# Patient Record
Sex: FEMALE | Race: WHITE | NOT HISPANIC OR LATINO | Employment: STUDENT | URBAN - METROPOLITAN AREA
[De-identification: names, ages, dates, MRNs, and addresses within clinical notes are randomized per-mention and may not be internally consistent; named-entity substitution may affect disease eponyms.]

---
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
## 2025-01-01 NOTE — ED PROVIDER NOTES
ED PHYSICIAN NOTE     CHIEF COMPLAINT      Chief Complaint   Patient presents with    Sent by MD Jenn Alvarado         EXTERNAL RECORDS REVIEWED  Outpatient Notes Evaluated by PCP earlier today and reported suicidal ideations.      HPI/ROS  LIMITATION TO HISTORY   Select: : None  OUTSIDE HISTORIAN(S):  Parent Mother and Father     Eva Washington is a 19 y.o. female with a history of anxiety, bipolar disorder and insomnia who presents to the ED for a psychiatric evaluation following an office visit with her PCP earlier today. Per his note, the patient had expressed suicidal ideations with the plan to cut her wrists with scissors or a knife. However, the patient states she briefly had these thoughts one month ago when she was feeling overwhelmed with her online  college courses as well as feeling less supported by her friends. She has been off of her medications for the past month due to undergoing an orthopedic surgery on 12/10/24 and not wanting to mix her medications. Since then, she has continued to meet with her Psychiatrist, Dr. Xavier Barragan, on a bimonthly basis and completes weekly therapy sessions. Recommended to restart her medication regimen during a recent visit with psychiatry. Refills for Tegretol, Vistaril, Ambien and Trazodone. 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 No suicidal ideations, concern for depression or homicidal ideations at this time. Social support includes both parents who she lives with. Previous history of suicide attempt with an inpatient hospitalization at [facility] in August 2024 secondary to cutting her wrists with staples.          PAST MEDICAL HISTORY  Past Medical History        Past Medical History:   Diagnosis Date    Anxiety      Asthma       being worked up    Dental disorder       BRACES ON BOTTOM; WEARS RETAINER    Depression      Heart murmur       pah (PULMONARY ARTIERAL HYPERTENSION) CARDIOLOGY  "CLEARED    Hemorrhagic disorder (HCC)       \"bleeds easily\"-not officially diagnosed    Migraines      Muscle disorder      Psychiatric problem       Anxiety    Pulmonary artery hypertension (HCC)       now being followed by cardiologist    Seizure ([facility])       Febrile seizure at 13 months old.    Shortness of breath              SOCIAL HISTORY  Social History   Social History           Tobacco Use    Smoking status: Never    Smokeless tobacco: Never   Vaping Use    Vaping status: Never Used   Substance Use Topics    Alcohol use: No    Drug use: No            CURRENT MEDICATIONS  Home Medications    **Home medications have not yet been reviewed for this encounter**            ALLERGIES  Allergies         Allergies   Allergen Reactions    Tree Nuts Food Allergy Rash and Itching       Per PT            PHYSICAL EXAM  VITAL SIGNS: /83   Pulse (!) 120   Temp 36.9 °C (98.4 °F) (Temporal)   Resp 18   Ht 1.651 m (5' 5\")   Wt 69.9 kg (154 lb)   LMP 12/05/2024   SpO2 100%   BMI 25.63 kg/m²    Constitutional: Awake and alert  HENT: Normal inspection  Eyes: Normal inspection  Neck: Grossly normal range of motion.  Cardiovascular: Normal heart rate, Normal rhythm.  Symmetric peripheral pulses.   Thorax & Lungs: No respiratory distress, No wheezing, No rales, No rhonchi, No chest tenderness.   Skin: No obvious rash.  Back: No tenderness.   Extremities: Air cast to right lower leg. No edema.  Neurologic: Grossly normal.   Psychiatric: Normal affect. No suicidal or homicidal ideations.        COURSE & MEDICAL DECISION MAKING     INITIAL ASSESSMENT, COURSE AND PLAN  Care Narrative: Patient presents for a psychiatric evaluation as recommended by her PCP. Patient is followed by psychiatry and therapist for a history of anxiety, bipolar disorder, insomnia and suicidal attempt in 8/2024 requiring hospitalization at [facility] for self harm to wrists with staples. Appears to have been miscommunication today between the patient and " her PCP as to the timing of suicidal ideations which occurred one month ago during a stressful period which has since resolved. No concern for suicidal ideations. Patient is goal oriented with a strong support system and regular follow up with mental health providers.  The patient's family is with her and they do not believe patient is suicidal.  Do not believe the patient requires a legal hold for psychiatric evaluation at this time.       I discussed case with [facility] [place] [place] team.      Follow up with psychiatry and therapist as scheduled. Start medication regimen as prescribed by psychiatry.           DISPOSITION AND DISCUSSIONS       Escalation of care considered, and ultimately not performed: Considered behavioral health consultation and legal hold       FINAL IMPRESSION  1.  Suicidal thoughts     This dictation was created using voice recognition software. The accuracy of the dictation is limited to the abilities of the software. I expect there may be some errors of grammar and possibly content. The nursing notes were reviewed and certain aspects of this information were incorporated into this note.     Electronically signed by: Vladimir Brown MD.    Patient was seen independently as well as with JAYNE Morales

## 2025-01-01 NOTE — ED NOTES
Pt provided discharge instructions and follow-up information. Pt verbalized understanding and all questions answered. Pt assisted from ED in WC accompanied by family.

## 2025-01-01 NOTE — ED NOTES
Chief Complaint   Patient presents with    Sent by MD Jenn Alvarado     Pt wheeled to triage sent by her doctor for jenn alvarado. Pt has history of SI in August which she went to [facility] and was placed on medication. Pt had foot surgery and stopped taking her medication fear of interfering with pain meds. She went to her pcp today and per pt she was misunderstood by her pcp when they asked if she has thoughts or harming her self. Pt denies SI/HI currently, she was seen by her psychiatric doctor this morning and was placed on new medication which they are waiting for pre authorization.   Pt to green 30

## 2025-01-01 NOTE — ASSESSMENT & PLAN NOTE
Patient with active suicidal ideation with plan to kill herself.  This is worsening since her prior visits now with formalized plan.  She has not been making headway in therapy and has been changed on multiple medications multiple times.  I am worried that this patient is acutely a threat to herself.  Discussed with the patient and her mother who are agreeable to go by private vehicle to ED.  Discussed in detail that if they did not go in private vehicle I would place patient on a legal hold.  They agree to go to [facility] emergency room.  If they are not there by 5 PM I will be calling 911 to their home for a welfare check and to place patient on legal hold.

## 2025-01-01 NOTE — ED PROVIDER NOTES
"ED PHYSICIAN NOTE    CHIEF COMPLAINT  Chief Complaint   Patient presents with    Sent by MD Jenn Alvarado       EXTERNAL RECORDS REVIEWED  Outpatient Notes Evaluated by PCP earlier today and reported suicidal ideations.     HPI/ROS  LIMITATION TO HISTORY   Select: : None  OUTSIDE HISTORIAN(S):  Parent Mother and Father    Eva Washington is a 19 y.o. female with a history of anxiety, bipolar disorder and insomnia who presents to the ED for a psychiatric evaluation following an office visit with her PCP earlier today. Per his note, the patient had expressed suicidal ideations with the plan to cut her wrists with scissors or a knife. However, the patient states she briefly had these thoughts one month ago when she was feeling overwhelmed with her online  college courses as well as feeling less supported by her friends. She has been off of her medications for the past month due to undergoing an orthopedic surgery on 12/10/24 and not wanting to mix her medications. Since then, she has continued to meet with her Psychiatrist, Dr. Xavier Barragan, on a bimonthly basis and completes weekly therapy sessions. Recommended to restart her medication regimen during a recent visit with psychiatry. Refills for Tegretol, Vistaril, Ambien and Trazodone. 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 No suicidal ideations, concern for depression or homicidal ideations at this time. Social support includes both parents who she lives with. Previous history of suicide attempt with an inpatient hospitalization at [facility] in August 2024 secondary to cutting her wrists with staples.         PAST MEDICAL HISTORY  Past Medical History:   Diagnosis Date    Anxiety     Asthma     being worked up    Dental disorder     BRACES ON BOTTOM; WEARS RETAINER    Depression     Heart murmur     pah (PULMONARY ARTIERAL HYPERTENSION) CARDIOLOGY CLEARED    Hemorrhagic disorder (HCC)     \"bleeds " "easily\"-not officially diagnosed    Migraines     Muscle disorder     Psychiatric problem     Anxiety    Pulmonary artery hypertension (HCC)     now being followed by cardiologist    Seizure ([facility])     Febrile seizure at 13 months old.    Shortness of breath        SOCIAL HISTORY  Social History     Tobacco Use    Smoking status: Never    Smokeless tobacco: Never   Vaping Use    Vaping status: Never Used   Substance Use Topics    Alcohol use: No    Drug use: No       CURRENT MEDICATIONS  Home Medications    **Home medications have not yet been reviewed for this encounter**         ALLERGIES  Allergies   Allergen Reactions    [employer] N[employer] Food Allergy Rash and Itching     Per PT       PHYSICAL EXAM  VITAL SIGNS: /83   Pulse (!) 120   Temp 36.9 °C (98.4 °F) (Temporal)   Resp 18   Ht 1.651 m (5' 5\")   Wt 69.9 kg (154 lb)   LMP 12/05/2024   SpO2 100%   BMI 25.63 kg/m²    Constitutional: Awake and alert  HENT: Normal inspection  Eyes: Normal inspection  Neck: Grossly normal range of motion.  Cardiovascular: Normal heart rate, Normal rhythm.  Symmetric peripheral pulses.   Thorax & Lungs: No respiratory distress, No wheezing, No rales, No rhonchi, No chest tenderness.   Skin: No obvious rash.  Back: No tenderness.   Extremities: Air cast to right lower leg. No edema.  Neurologic: Grossly normal.   Psychiatric: Normal affect. No suicidal or homicidal ideations.      COURSE & MEDICAL DECISION MAKING    INITIAL ASSESSMENT, COURSE AND PLAN  Care Narrative: Patient presents for a psychiatric evaluation as recommended by her PCP. Patient is followed by psychiatry and therapist for a history of anxiety, bipolar disorder, insomnia and suicidal attempt in 8/2024 requiring hospitalization at [facility] for self harm to wrists with staples. Appears to have been miscommunication today between the patient and her PCP as to the timing of suicidal ideations which occurred one month ago during a stressful period which has since " resolved. No concern for suicidal ideations. Patient is goal oriented with a strong support system and regular follow up with mental health providers. Do not believe the patient requires a legal hold for psychiatric evaluation at this time.       Follow up with psychiatry and therapist as scheduled. Start medication regimen as prescribed by psychiatry.       FINAL IMPRESSION  Psychiatric evaluation    This dictation was created using voice recognition software. The accuracy of the dictation is limited to the abilities of the software. I expect there may be some errors of grammar and possibly content. The nursing notes were reviewed and certain aspects of this information were incorporated into this note.    Electronically signed by: Halima Morales, Student, 12/31/2024

## 2025-01-03 PROCEDURE — RXMED WILLOW AMBULATORY MEDICATION CHARGE: Performed by: NURSE PRACTITIONER

## 2025-01-06 ENCOUNTER — PHARMACY VISIT (OUTPATIENT)
Dept: PHARMACY | Facility: MEDICAL CENTER | Age: 20
End: 2025-01-06
Payer: COMMERCIAL

## 2025-01-07 ENCOUNTER — PHYSICAL THERAPY (OUTPATIENT)
Dept: PHYSICAL THERAPY | Facility: REHABILITATION | Age: 20
End: 2025-01-07
Attending: STUDENT IN AN ORGANIZED HEALTH CARE EDUCATION/TRAINING PROGRAM
Payer: COMMERCIAL

## 2025-01-07 DIAGNOSIS — M20.10 VALGUS DEFORMITY OF GREAT TOE, UNSPECIFIED LATERALITY: ICD-10-CM

## 2025-01-07 DIAGNOSIS — Z15.89 MONOALLELIC MUTATION OF SPTAN1 GENE: ICD-10-CM

## 2025-01-07 DIAGNOSIS — R26.9 GAIT DIFFICULTY: ICD-10-CM

## 2025-01-07 DIAGNOSIS — G11.4 AUTOSOMAL DOMINANT SPASTIC PARAPLEGIA (HCC): ICD-10-CM

## 2025-01-07 DIAGNOSIS — M62.81 MUSCLE WEAKNESS (GENERALIZED): ICD-10-CM

## 2025-01-07 PROCEDURE — 97110 THERAPEUTIC EXERCISES: CPT

## 2025-01-07 NOTE — OP THERAPY PROGRESS SUMMARY
Outpatient Physical Therapy  PROGRESS SUMMARY NOTE      [facility] [place] [address]  [address]  [facility] [address]  Phone:  [phone]  Fax:  [phone]    Date of Visit: 01/07/2025    Patient: Eva Washington  YOB: 2005  MRN: [id number]     Referring Provider: Reed De Los Santos M.D.  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 Gait difficulty  R26.9   4. Muscle weakness (generalized)  M62.81   5. Valgus deformity of great toe, unspecified laterality  M20.10       Rehab Potential: good    Progress Report Period: 11/27/24-1/7/2025    Functional Assessment Used          Objective Findings and Assessment:   Patient progression towards goals: Due to surgical intervention of LE pt has not been seen since 11/27 but has been cleared to resume PT with restrictions including walking boot. Due to severe gait changes associated with the walking boot did not deem appropriate to reassess minibest or 6MWT. Pt required HHA throughout session and 2 max A for LOB while walking. The walking boot significantly increases her risk of falling. Education provided about possibly using cane/AD but pt declined stating she has not fallen at home.  Due to healing precautions reiterated importance of wearing boot for all weight bearing activities including HEP-bridging. She also demos sig foot weakness post op with trace toes flexion and ext.   She continues to demo gait deficits, strength, and balance deficits  secondary to genetic condition and deconditioning secondary to more immobility post op. She will benefit from skilled PT 2 x12 to improve deficits, maximize function, and dec fall risk as well as improving ROM, strength, and pain post op.         Required HHA to parking lot after max A LOB walking to lobby after session.        Short Term Goals:   Pt will be compliant with HEP cont  Pt will improve miniBEST by 4 MCID to decr risk for falls-MET, cont  (20/28)  NEW: pt will report pain while walking in boot 5/10 or better consistently.   NEW: Pt will demonstrate full toe flexion/ext AROM to improve gait with pain less than 1/10.        Short term goal time span:  2-4 weeks      Long Term Goals:    Pt will be able to demo improvement 5xSTS <12sec cont-regression due to post op  Pt will be able to improvement with 6MWT by 200ft-Not MET, cont not assessed  Pt will be able to demo improvement on miniBEST >21, low fall [id number]-16/28, cont not assessed  NEW Pt will demonstrate ability to ambulate without boot, when cleared, 150 feet without LOB to dec fall risk.       Goals:   Short Term Goals:   Pt will be compliant with HEP   Pt will improve miniBEST by 4 MCID to decr risk for falls  pt will report pain while walking in boot 5/10 or better consistently.   Pt will demonstrate full toe flexion/ext AROM to improve gait with pain less than 1/10.  Short term goal time span:  2-4 weeks      Long Term Goals:    Pt will be able to demo improvement 5xSTS <12sec  Pt will be able to improvement with 6MWT by 200ft  Pt will be able to demo improvement on miniBEST >21, low fall risk  Pt will demonstrate ability to ambulate without boot, when cleared, 150 feet without LOB to dec fall risk.     Long term goal time span:  2-4 months    Plan:   Planned therapy interventions:  Gait Training (CPT 86960), Therapeutic Activities (CPT 51465), Therapeutic Exercise (CPT 95801) and Neuromuscular Re-education (CPT 36117)  Frequency:  2x  week  Duration in weeks:  10      Referring provider co-signature:  I have reviewed this plan of care and my co-signature certifies the need for services.     Certification Period: 01/07/2025 to 03/04/25    Physician Signature: ________________________________ Date: ______________

## 2025-01-07 NOTE — OP THERAPY DAILY TREATMENT
Outpatient Physical Therapy  DAILY TREATMENT     [facility] Physical Therapy [address].  [address]  [place] [address]  Phone:  [phone]  Fax:  [phone]    Date: 01/07/2025    Patient: Eva Washington  YOB: 2005  MRN: [id number]     Time Calculation    Start time: 1415  Stop time: 1457 Time Calculation (min): 42 minutes         Chief Complaint: Post-Op Pain, Difficulty Walking, Knee Problem, and Weakness    Visit #: 12    SUBJECTIVE:   Pt last seen 11/27 as she underwent  bunion surgery and tendon lengthening/fusion on 12/10  Still wearing boot.  Is completing an [email or web address] course and hopes to get a job through them-[employer] but online. Hopes to become more financially stable. States no falls since surgery  Pt reports Rt foot pain fluctuates 8-10/10 thinks its due to how much she is weight bearing. Not icing consistently  Did get infection post op, has not seen Dr since .   Per Ortho PT for lesser MTP motion, desensitization     OBJECTIVE:  Current objective measures:   TUG (off the shelf AFO's (custom made) R LE, off the shelf PLS)  #1: 21sec, 1 large R lat LOB with turning, able to catch w/o stagger but inc time  #2: 19sec  # 3: 18sec    TUG#1: 14sec, w/o bracing    MiniBest Test    Anticipatory  STS:2  Rise to Toes:1-partial ROM  SLS: Left 1: 5 sec Right. 1: 2-3sec  Subscore 4 /6    Reactive Postural Control  Compensatory Stepping Correction-Forward:0  Compensatory Stepping Correction-Backwards:0  Compensatory Stepping Correction-Lateral: Left:0  Right:0  Subscore 0/6 no stepping    Sensory Orientation:  Stance (feet together) Eyes open, Firm surface:2  Stance (feet together) Eyes closed, Foam Surface:2  Incline -eyes closed:2  Subscore 6 /6    Dynamic Gait  Changes in gait speed: 1 able to change speed, asymmetric gait  Walk with head turns-horizontal: 1 sig instability  Walk with pivot turns: 1 ,>3 sec, no instability  Step over obstacles: 0 able to  "attempt, assist from falling  TU.8 Tug CO.6 no LOB, counting backward from 100, unable to count by 2's backward    Subscore  /10    14/28    Less than 21 indicates increased risk of falling   6MWT: 420ft still reaching for walls for int touch down balance, diff with turning,     PN   TU.7 CGA  5STS: 15.31    Left foot: good wound healing noted on medial gastroc and lateral ankle. One small open area on dorsum of great toe. Reiterated importance of keeping clean, dry and instructed to discontinue the Vaseline she was putting on due to open sore. Mild redness at dorsum around open area but not warm     Lt Great toe/toes ext trace, toe flexion trace    Therapeutic Exercises (CPT 45129):     1. bridge, HEP    2. SLR, unable    3. sidelying hip abd/clamshell, HEP, R(2+/5 more diff than L(3/5)    4. BKFO, NT    5. Nu step BLE's and BUE's, NT, no charge    6. BLE shuttle, NT, 6 cords    7. SL, NT, 5 cords 2 x 15B    8. STS form 20\"H mat table, NT, cued with breath coordination, sig inc in fatigue, required inc rest break before leaving.    9. standing hip abd, NT, x5    10. lateral stepping, NT, [id number] ea side    11. Prone hip flex st, NT, x90sec hold by therapist    13. TUG, 5STS see objective    14. education on scar desesitization, 5-10 min daily, diff textured    15. toe crunches    16. educaiton on post op pain, do not push above 4/10 pain, icing pain impact on delayed healing    20. -, last PN: 10/23      Therapeutic Exercise Summary:     HEP: bridging, SL abduction, toe crunches, toe ext      Time-based treatments/modalities:    Physical Therapy Timed Treatment Charges  Therapeutic exercise minutes (CPT 89685): 42 minutes      ASSESSMENT:   Response to treatment:    Due to surgical intervention of LE pt has not been seen since  but has been cleared to resume PT with restrictions including walking boot. Due to severe gait changes associated with the walking boot did not deem " appropriate to reassess minibest or 6MWT. Pt required HHA throughout session and 2 max A for LOB while walking. The walking boot significantly increases her risk of falling. Education provided about possibly using cane/AD but pt declined stating she has not fallen at home.  Due to healing precautions reiterated importance of wearing boot for all weight bearing activities including HEP-bridging. She also demos sig foot weakness post op with trace toes flexion and ext.   She continues to demo gait deficits, strength, and balance deficits secondary to genetic condition and deconditioning secondary to more immobility post op. She will benefit from skilled PT 2 x12 to improve deficits, maximize function, and dec fall risk as well as improving ROM, strength, and pain post op.       Required HHA to parking lot after max A LOB walking to lobby after session.      Short Term Goals:   Pt will be compliant with HEP cont  Pt will improve miniBEST by 4 MCID to decr risk for falls-MET, cont  (20/28)  NEW: pt will report pain while walking in boot 5/10 or better consistently.   NEW: Pt will demonstrate full toe flexion/ext AROM to improve gait with pain less than 1/10.        Short term goal time span:  2-4 weeks      Long Term Goals:    Pt will be able to demo improvement 5xSTS <12sec cont-regression due to post op  Pt will be able to improvement with 6MWT by 200ft-Not MET, cont not assessed  Pt will be able to demo improvement on miniBEST >21, low fall [id number]-[id number]-19/28, cont not assessed  NEW Pt will demonstrate ability to ambulate without boot, when cleared, 150 feet without LOB to dec fall risk.     PLAN/RECOMMENDATIONS:   Plan for treatment: therapy treatment to continue next visit.  Planned interventions for next visit: Lite gait continue with current treatment.

## 2025-01-10 ENCOUNTER — PHYSICAL THERAPY (OUTPATIENT)
Dept: PHYSICAL THERAPY | Facility: REHABILITATION | Age: 20
End: 2025-01-10
Attending: STUDENT IN AN ORGANIZED HEALTH CARE EDUCATION/TRAINING PROGRAM
Payer: COMMERCIAL

## 2025-01-10 DIAGNOSIS — M20.10 VALGUS DEFORMITY OF GREAT TOE, UNSPECIFIED LATERALITY: ICD-10-CM

## 2025-01-10 DIAGNOSIS — Z15.89 MONOALLELIC MUTATION OF SPTAN1 GENE: ICD-10-CM

## 2025-01-10 DIAGNOSIS — G11.4 AUTOSOMAL DOMINANT SPASTIC PARAPLEGIA (HCC): ICD-10-CM

## 2025-01-10 DIAGNOSIS — M62.81 MUSCLE WEAKNESS (GENERALIZED): ICD-10-CM

## 2025-01-10 DIAGNOSIS — R26.9 GAIT DIFFICULTY: ICD-10-CM

## 2025-01-10 PROCEDURE — 97112 NEUROMUSCULAR REEDUCATION: CPT

## 2025-01-10 PROCEDURE — 97110 THERAPEUTIC EXERCISES: CPT

## 2025-01-10 NOTE — OP THERAPY DAILY TREATMENT
"  Outpatient Physical Therapy  DAILY TREATMENT     [facility] Physical Therapy [address].  [address]  [place] [address]  Phone:  [phone]  Fax:  [phone]    Date: 01/10/2025    Patient: Eva Washington  YOB: 2005  MRN: [id number]     Time Calculation    Start time: 1503  Stop time: 1545 Time Calculation (min): 42 minutes         Chief Complaint: Difficulty Walking and Loss Of Balance    Visit #: 13    SUBJECTIVE:   Pt last seen  as she underwent  bunion surgery and tendon lengthening/fusion on 12/10  Still wearing boot.  Pt reporting wanting to get out of the house to volunteer at [facility].    Did get infection post op, has not seen Dr since .   Per Ortho PT for lesser MTP motion, desensitization     OBJECTIVE:  Current objective measures:   TUG (off the shelf AFO's (custom made) R LE, off the shelf PLS)  #1: 21sec, 1 large R lat LOB with turning, able to catch w/o stagger but inc time  #2: 19sec  # 3: 18sec    PN   TU.7 CGA  5STS: 15.31    Left foot: good wound healing noted on medial gastroc and lateral ankle. One small open area on dorsum of great toe. Reiterated importance of keeping clean, dry and instructed to discontinue the Vaseline she was putting on due to open sore. Mild redness at dorsum around open area but not warm     Lt Great toe/toes ext trace, toe flexion trace    Therapeutic Exercises (CPT 47907):     1. bridge, HEP    2. SLR, unable    3. sidelying hip abd/clamshell, HEP, R(2+/5 more diff than L(3/5)    4. BKFO, NT    5. Nu step BLE's and BUE's, NT, no charge    6. BLE shuttle, NT, 6 cords    7. SL, NT, 5 cords 2 x 15B    8. STS form 20\"H mat table, NT, cued with breath coordination, sig inc in fatigue, required inc rest break before leaving.    9. standing hip abd, NT, x5    10. lateral stepping, NT, [id number] ea side    11. Prone hip flex st, 90sec x 2 ea side    12. Prone hip extension, 5iso hold x 10    13. Prone " superwoman, 90sec, x30sec    15. toe crunches, x10    16. toe ext, x10, last PN: 1/7      Therapeutic Exercise Summary:   HEP: bridging, SL abduction, toe crunches, toe ext    Therapeutic Treatments and Modalities:     1. Neuromuscular Re-education (CPT 39986), see below    Therapeutic Treatment and Modalities Summary: NMR: Edu on STM with deeper fascial layers and sustained stretching, anti-compressive lifting off, circles cw/ccw.  Reinforced LE strengthening & stretching with boot and non-wbing when not in boot to allow LE strengthening. Monitoring LE stretching   Single compressive TEDS given for scar covering, hygiene and avoiding friction of skin while in boot. Edu to clean ace wrap/visibly dirty, risk of infections & utilizing clean bands.    Time-based treatments/modalities:    Physical Therapy Timed Treatment Charges  Neuromusc re-ed, balance, coor, post minutes (CPT 92287): 20 minutes  Therapeutic exercise minutes (CPT 16341): 22 minutes      ASSESSMENT:   Response to treatment:  Demo fair tolerance to STM, reinforced deeper mobilization, just avoiding areas of infection and that aren't fully closed.     PLAN/RECOMMENDATIONS:   Plan for treatment: therapy treatment to continue next visit.  Planned interventions for next visit: continue with current treatment.

## 2025-01-16 ENCOUNTER — APPOINTMENT (OUTPATIENT)
Dept: PHYSICAL THERAPY | Facility: REHABILITATION | Age: 20
End: 2025-01-16
Attending: STUDENT IN AN ORGANIZED HEALTH CARE EDUCATION/TRAINING PROGRAM
Payer: COMMERCIAL

## 2025-01-17 ENCOUNTER — TELEPHONE (OUTPATIENT)
Dept: HEALTH INFORMATION MANAGEMENT | Facility: OTHER | Age: 20
End: 2025-01-17
Payer: COMMERCIAL

## 2025-01-21 ENCOUNTER — PHYSICAL THERAPY (OUTPATIENT)
Dept: PHYSICAL THERAPY | Facility: REHABILITATION | Age: 20
End: 2025-01-21
Attending: STUDENT IN AN ORGANIZED HEALTH CARE EDUCATION/TRAINING PROGRAM
Payer: COMMERCIAL

## 2025-01-21 DIAGNOSIS — G11.4 AUTOSOMAL DOMINANT SPASTIC PARAPLEGIA (HCC): ICD-10-CM

## 2025-01-21 DIAGNOSIS — M62.81 MUSCLE WEAKNESS (GENERALIZED): ICD-10-CM

## 2025-01-21 DIAGNOSIS — R26.9 GAIT DIFFICULTY: ICD-10-CM

## 2025-01-21 DIAGNOSIS — Z15.89 MONOALLELIC MUTATION OF SPTAN1 GENE: ICD-10-CM

## 2025-01-21 PROCEDURE — 97110 THERAPEUTIC EXERCISES: CPT

## 2025-01-21 NOTE — OP THERAPY DAILY TREATMENT
"  Outpatient Physical Therapy  DAILY TREATMENT     [facility] Physical Therapy [place] [address]  [address]  [place] [address]  Phone:  [phone]  Fax:  [phone]    Date: 2025    Patient: Eva Washington  YOB: 2005  MRN: [id number]     Time Calculation    Start time: 1415  Stop time: 1457 Time Calculation (min): 42 minutes         Chief Complaint: Post-Op Pain and Weakness    Visit #: 14    SUBJECTIVE:   Reports new wound on incision on her calf, has been covering with a bandaid. When asked wasn't sure where it came from but has had a few days although looks a little better. After asking questions  did report her brothers were diagnosed with ringworm last week and she has numenous sores. Has been using a cream for a week and sores are  better    OBJECTIVE:  Current objective measures:   TUG (off the shelf AFO's (custom made) R LE, off the shelf PLS)  #1: 21sec, 1 large R lat LOB with turning, able to catch w/o stagger but inc time  #2: 19sec  # 3: 18sec    PN   TU.7 CGA  5STS: 15.31    Left foot: good wound healing noted on medial gastroc and lateral ankle. One small open area on dorsum of great toe. Reiterated importance of keeping clean, dry and instructed to discontinue the Vaseline she was putting on due to open sore. Mild redness at dorsum around open area but not warm     Lt Great toe/toes ext trace, toe flexion trace    Therapeutic Exercises (CPT 94276):     1. bridge, HEP    2. SLR, unable    3. sidelying hip abd/clamshell, HEP, R(2+/5 more diff than L(3/5)    4. BKFO, NT    5. Nu step BLE's and BUE's, NT, no charge    6. BLE shuttle, NT, 6 cords    7. SL, NT, 5 cords 2 x 15B    8. STS form 20\"H mat table, NT, cued with breath coordination, sig inc in fatigue, required inc rest break before leaving.    9. standing hip abd, NT, x5    10. lateral stepping, NT, [id number] ea side    11. Prone hip flex st, 90sec x 2 e    12. Prone hip extension, 5iso hold x " 12    13. Prone superwoman, 90sec, [id number] NT    14. bridge c boot, 2 x 12    15. toe crunches sheet, x10    16. toe ext, x10, last PN: 1/7      Therapeutic Exercise Summary: Sig time assessing wound new wound on proximal/medial gastroc incision which was previously healed. Approx nickel size wound, irregular edges, mild redness. After time discussing possible causes such as friction, checked boot for irritations. At that time observed round wounds on her arms/hands. At that time pt reported her brothers were diagnosed with ring worm last week and she has been putting ointment on her wounds not the one on her leg. Denies fever. PT took picture on her phone and instructed to urgently contact her surgeon and monitor for a fever. Provided numerous tubigrip as her current tubi  provided last session was visibly soiled. Education on importance of keeping wound clean.        HEP: bridging, SL abduction, toe crunches, toe ext    Time-based treatments/modalities:    Physical Therapy Timed Treatment Charges  Therapeutic exercise minutes (CPT 25825): 42 minutes      ASSESSMENT:   Response to treatment:  Encouraged pt to urgently contact physician due to concerns of ringworm sore on previously healed incision. Wound care assessment and education required sig time of session. See urgent care if wound worsens before hearing from surgeon or any fever    PLAN/RECOMMENDATIONS:   Plan for treatment: therapy treatment to continue next visit.  Planned interventions for next visit: continue with current treatment.

## 2025-01-29 ENCOUNTER — PHYSICAL THERAPY (OUTPATIENT)
Dept: PHYSICAL THERAPY | Facility: REHABILITATION | Age: 20
End: 2025-01-29
Attending: STUDENT IN AN ORGANIZED HEALTH CARE EDUCATION/TRAINING PROGRAM
Payer: COMMERCIAL

## 2025-01-29 DIAGNOSIS — M20.10 VALGUS DEFORMITY OF GREAT TOE, UNSPECIFIED LATERALITY: ICD-10-CM

## 2025-01-29 DIAGNOSIS — Z74.09 IMPAIRED FUNCTIONAL MOBILITY, BALANCE, GAIT, AND ENDURANCE: ICD-10-CM

## 2025-01-29 DIAGNOSIS — R26.9 GAIT DIFFICULTY: ICD-10-CM

## 2025-01-29 DIAGNOSIS — G11.4 AUTOSOMAL DOMINANT SPASTIC PARAPLEGIA (HCC): ICD-10-CM

## 2025-01-29 DIAGNOSIS — M62.81 MUSCLE WEAKNESS (GENERALIZED): ICD-10-CM

## 2025-01-29 DIAGNOSIS — Z15.89 MONOALLELIC MUTATION OF SPTAN1 GENE: ICD-10-CM

## 2025-01-29 DIAGNOSIS — R29.898 LEG WEAKNESS, BILATERAL: ICD-10-CM

## 2025-01-29 PROCEDURE — 97112 NEUROMUSCULAR REEDUCATION: CPT

## 2025-01-29 PROCEDURE — 97110 THERAPEUTIC EXERCISES: CPT

## 2025-01-29 NOTE — OP THERAPY DAILY TREATMENT
"  Outpatient Physical Therapy  DAILY TREATMENT     [facility] Physical [address].  [address]  [place] [address]  Phone:  [phone]  Fax:  [phone]    Date: 2025    Patient: Eva Washington  YOB: 2005  MRN: [id number]     Time Calculation                   Chief Complaint: No chief complaint on file.    Visit #: 15    SUBJECTIVE:   Reports d/c'd from walking boot from yesterday.  Had difficulty and pain when donning shoe from walking boot transition.    Reports no restrictions since yesterday.      OBJECTIVE:  Current objective measures:     Ankle AROM/P  R/L  DF: 0;       PN   TU.7 CGA  5STS: 15.31    Left foot: good wound healing noted on medial gastroc and lateral ankle. One small open area on dorsum of great toe. Reiterated importance of keeping clean, dry and instructed to discontinue the Vaseline she was putting on due to open sore. Mild redness at dorsum around open area but not warm     Lt Great toe/toes ext trace, toe flexion trace    Therapeutic Exercises (CPT 05209):     1. bridge, HEP    2. SLR, unable    3. sidelying hip abd/clamshell, HEP, R(2+/5 more diff than L(3/5)    5. Nu step BLE's and BUE's, NT, no charge    6. BLE shuttle, NT, 6 cords    7. SL, NT, 5 cords 2 x 15B    8. STS form 20\"H mat table, NT, cued with breath coordination, sig inc in fatigue, required inc rest break before leaving.    9. standing hip abd, NT, x5    10. lateral stepping, NT, [id number] ea side    11. Prone hip flex st, NT    12. Prone hip extension, NT    13. Prone superwoman, NT    14. bridge c boot, HEP    15. toe crunches sheet, x10    16. toe ext, x10    17. PF, x10, orange tband-HEP    18. gravity assisted EV/IV, x10, HEP    19. Passive great toe stretch w/ DF, 60sec x 2, HEP, last PN:       Therapeutic Exercise Summary: Ankle AROM/P  R/L  DF: 0/;       Therapeutic Treatments and Modalities:     1. Neuromuscular Re-education (CPT " 91481), see below    Therapeutic Treatment and Modalities Summary: NMR: Discussed d/c from boot & adding new graded ex's.   Discussed paying attention to fatigue, pain and swelling with returning to weight bearing.   Discussed RICE.  Massage to R foot and 3 toes to edu pt on self massage to manage swelling to optimize healing and decreasing pain with weight bearing.     Time-based treatments/modalities:       ASSESSMENT:   Response to treatment:  Pt demo WFL PROM DF, notable swelling in foot and toes, edu on compression & elev cont. Reinforced need to inc LE strengthening program at home utilizing HEP: clamsh, hip abd. STS & bridging with shoes donned    PLAN/RECOMMENDATIONS:   Plan for treatment: therapy treatment to continue next visit.  Planned interventions for next visit: continue with current treatment.

## 2025-02-05 ENCOUNTER — PHYSICAL THERAPY (OUTPATIENT)
Dept: PHYSICAL THERAPY | Facility: REHABILITATION | Age: 20
End: 2025-02-05
Attending: STUDENT IN AN ORGANIZED HEALTH CARE EDUCATION/TRAINING PROGRAM
Payer: COMMERCIAL

## 2025-02-05 DIAGNOSIS — M62.81 MUSCLE WEAKNESS (GENERALIZED): ICD-10-CM

## 2025-02-05 DIAGNOSIS — G11.4 AUTOSOMAL DOMINANT SPASTIC PARAPLEGIA (HCC): ICD-10-CM

## 2025-02-05 DIAGNOSIS — R26.9 GAIT DIFFICULTY: ICD-10-CM

## 2025-02-05 DIAGNOSIS — Z15.89 MONOALLELIC MUTATION OF SPTAN1 GENE: ICD-10-CM

## 2025-02-05 PROCEDURE — 97110 THERAPEUTIC EXERCISES: CPT

## 2025-02-05 PROCEDURE — 97112 NEUROMUSCULAR REEDUCATION: CPT

## 2025-02-05 NOTE — OP THERAPY DAILY TREATMENT
Outpatient Physical Therapy  DAILY TREATMENT     [facility] Physical Therapy [address].  [address]  [place] [address]  Phone:  [phone]  Fax:  [phone]    Date: 2025    Patient: Eva Washington  YOB: 2005  MRN: [id number]     Time Calculation    Start time: 1500  Stop time: 1541 Time Calculation (min): 41 minutes         Chief Complaint: Weakness, Loss Of Balance, and Difficulty Walking    Visit #: 16    SUBJECTIVE:   Some soreness the last week since DC from boot. Wore a shoe today and was a little swollen. Reported it was achey but later called it a 9/10. Been complaint with HEP. Walking is a little better.    OBJECTIVE:  Current objective measures:     Ankle AROM/P  R/L  DF: 0/12; 412      PN 1/  TU.7 CGA  5STS: 15.31    Left foot: good wound healing noted on medial gastroc and lateral ankle. One small open area on dorsum of great toe. Reiterated importance of keeping clean, dry and instructed to discontinue the Vaseline she was putting on due to open sore. Mild redness at dorsum around open area but not warm     Lt Great toe/toes ext trace, toe flexion trace    2/5  TUG 15.35 CGA  5STS 11.21 BUE supp  No UE 12.11    Gait no boot : constnatly reaching for wall, items, leaning into therapist with close SBA. One min A for LOB.  MiniBest Test    Anticipatory  STS: 2  Rise to Toes:1  SLS: Left 1. 4 2. 2 Right. 1. 2 2.  3 score 1  Subscore 4 /6    Reactive Postural Control*unsafe at this  Compensatory Stepping Correction-Forward:  Compensatory Stepping Correction-Backwards:  Compensatory Stepping Correction-Lateral: Left:  Right:  Subscore 0/6    Sensory Orientation:  Stance (feet together) Eyes open, Firm surface:2  Stance (feet together) Eyes closed, Foam Surface:1  Incline -eyes closed:2  Subscore 5 /6    Dynamic Gait  Changes in gait speed:1  Walk with head turns-horizontal:1 sig   Walk with pivot turns: 1 > 3 sec  Step over obstacles: 0 unable d/t  "safety   TUG:  Tug CO (counting backwards by 2s from 100) 2    Subscore 5 /10    14/28    Less than 21 indicates increased risk of falling     Therapeutic Exercises (CPT 72930):     1. bridge, 2 x 15    2. goal reassement see objective    3. reiterated importance of returning to HEP. dec pushing  hard walking as she is pushing to 9/10 pain    4. 5STS, TUG    5. STS from 18\" table no UE, 2 x 15 SBA, sig fatigue    6. BLE shuttle, NT, 6 cords    7. SL, NT, cued with breath coordination, sig inc in fatigue, required inc rest break before leaving.    9. standing hip abd, NT, x5    10. lateral stepping, NT, [id number] ea side    11. Prone hip flex st, NT    12. Prone hip extension, NT    13. Prone superwoman, NT    19. \, last PN: 2/      Therapeutic Exercise Summary: Ankle AROM/P  R/L  DF: 0/12; 4/12      Therapeutic Treatments and Modalities:     1. Neuromuscular Re-education (CPT 93058), see below    Therapeutic Treatment and Modalities Summary: NMR: minibest see objective    For improving core stabilty and LE control   Quadruped alt LE hip ext. First 3 x 3 reps mod/max A for lift, after rest able to perform 2 x 3 B without assist 75% of ROM    Time-based treatments/modalities:    Physical Therapy Timed Treatment Charges  Neuromusc re-ed, balance, coor, post minutes (CPT 32881): 25 minutes  Therapeutic exercise minutes (CPT 67241): 16 minutes  ASSESSMENT:   Response to treatment:  Pt has completed 5 follow ups since last PN in which additions were made to the POC to accommodate a recent surgical interventions requiring pt to wear a boot impacting her gait and balance. Although she has demonstrated good progress towards goals and has shown some improvement in functional testing such as the 5STS since November pre-op she continues to be a high fall risk as supported by the minibest as well as her inability to ambulate more than 20-30 feet without UE support or sig LOB. Pot op, she demos good passive ankle ROM and " will likely cont to be limited in AAROM as this was PLOF. She will cont to benefit from skilled PT 1-2x 8 weeks to improve ability to ambulate longer periods without LOB or UE support to be able to safely engage in the community, improve balance to dec fall risk and maximize function.    Short Term Goals:   Pt will be compliant with HEP progressing-cont  Pt will improve miniBEST by 4 MCID to decr risk for falls cont  pt will report pain while walking in boot 5/10 or better consistently. Not met, cont  Pt will demonstrate full toe flexion/ext AROM to improve gait with pain less than 1/10. Cont-somewhat limited  Short term goal time span:  2-4 weeks      Long Term Goals:    Pt will be able to demo improvement 5xSTS <12sec met with and without UE partially met  Pt will be able to improvement with 6MWT by 200ft not assessed due to ambulation concerns for safety/distance  Pt will be able to demo improvement on miniBEST >21, low fall risk cont  Pt will demonstrate ability to ambulate without boot, when cleared, 150 feet without LOB to dec fall risk. Cont- grabbing wall frequently      Long term goal time span:  2-4 months  PLAN/RECOMMENDATIONS:   Plan for treatment: therapy treatment to continue next visit.  Planned interventions for next visit: continue with current treatment.     Reactive balance, tug o war?

## 2025-02-05 NOTE — OP THERAPY PROGRESS SUMMARY
Outpatient Physical Therapy  PROGRESS SUMMARY NOTE      [facility] Physical Therapy [place] [address] [facility].  [address]  [facility] [address]  Phone:  [phone]  Fax:  [phone]    Date of Visit: 02/05/2025    Patient: Eva Washington  YOB: 2005  MRN: [id number]     Referring Provider: Reed De Los Santos M.D.  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 Gait difficulty  R26.9   4. Muscle weakness (generalized)  M62.81       Rehab Potential: good    Progress Report Period: 1/7/25-2/5/25    Functional Assessment Used      Tug, 5sts, minibest    Objective Findings and Assessment:   Patient progression towards goals: Pt has completed 5 follow ups since last PN in which additions were made to the POC to accommodate a recent surgical interventions requiring pt to wear a boot impacting her gait and balance. Although she has demonstrated good progress towards goals and has shown some improvement in functional testing such as the 5STS since November pre-op she continues to be a high fall risk as supported by the minibest as well as her inability to ambulate more than 20-30 feet without UE support or sig LOB. Pot op, she demos good passive ankle ROM and will likely cont to be limited in AAROM as this was PLOF. She will cont to benefit from skilled PT 1-2x 8 weeks to improve ability to ambulate longer periods without LOB or UE support to be able to safely engage in the community, improve balance to dec fall risk and  maximize function.    Short Term Goals:   Pt will be compliant with HEP progressing-cont  Pt will improve miniBEST by 4 MCID to decr risk for falls cont  pt will report pain while walking in boot 5/10 or better consistently. Not met, cont  Pt will demonstrate full toe flexion/ext AROM to improve gait with pain less than 1/10. Cont-somewhat limited  Short term goal time span:  2-4 weeks      Long Term Goals:    Pt will be able to demo improvement 5xSTS <12sec met with and without UE partially met  Pt will be able to improvement with 6MWT by 200ft not assessed due to ambulation concerns for safety/distance  Pt will be able to demo improvement on miniBEST >21, low fall risk cont  Pt will demonstrate ability to ambulate without boot, when cleared, 150 feet without LOB to dec fall risk. Cont- grabbing wall frequently     Objective findings and assessment details: TUG 15.35 CGA  5STS 11.21 BUE supp  No UE 12.11     Gait no boot : constnatly reaching for wall, items, leaning into therapist with close SBA. One min A for LOB.  MiniBest Test     Anticipatory  STS: 2  Rise to Toes:1  SLS: Left 1. 4 2. 2 Right. 1. 2 2.  3 score 1  Subscore 4 /6     Reactive Postural Control*unsafe at this  Compensatory Stepping Correction-Forward:  Compensatory Stepping Correction-Backwards:  Compensatory Stepping Correction-Lateral: Left:  Right:  Subscore 0/6     Sensory Orientation:  Stance (feet together) Eyes open, Firm surface:2  Stance (feet together) Eyes closed, Foam Surface:1  Incline -eyes closed:2  Subscore 5 /6     Dynamic Gait  Changes in gait speed:1  Walk with head turns-horizontal:1 sig   Walk with pivot turns: 1 > 3 sec  Step over obstacles: 0 unable d/t safety   TUG:  Tug CO (counting backwards by 2s from 100) 2     Subscore 5 /10              Goals:   Short Term Goals:   Pt will be compliant with HEP  Pt will improve miniBEST by 4 MCID to decr risk for falls   pt will report pain while walking in boot 5/10 or  better consistently.  Pt will demonstrate full toe flexion/ext AROM to improve gait with pain less than 1/10.   Short term goal time span:  2-4 weeks      Long Term Goals:    Pt will be able to demo improvement 5xSTS <12sec met with and without UE   Pt will be able to improvement with 6MWT by 200ft   Pt will be able to demo improvement on miniBEST >21, low fall risk   Pt will demonstrate ability to ambulate without boot, when cleared, 150 feet without LOB to dec fall risk  Long term goal time span:  6-8 weeks    Plan:   Planned therapy interventions:  Gait Training (CPT 93402), Therapeutic Activities (CPT 48874), Therapeutic Exercise (CPT 04912) and Neuromuscular Re-education (CPT 84651)  Frequency:  2x week  Duration in weeks:  8      Referring provider co-signature:  I have reviewed this plan of care and my co-signature certifies the need for services.     Certification Period: 02/05/2025 to 04/03/25    Physician Signature: ________________________________ Date: ______________

## 2025-02-06 ENCOUNTER — HOSPITAL ENCOUNTER (OUTPATIENT)
Dept: LAB | Facility: MEDICAL CENTER | Age: 20
End: 2025-02-06
Attending: SURGERY
Payer: COMMERCIAL

## 2025-02-06 LAB
ALBUMIN SERPL BCP-MCNC: 4.4 G/DL (ref 3.2–4.9)
ALBUMIN/GLOB SERPL: 1.4 G/DL
ALP SERPL-CCNC: 97 U/L (ref 30–99)
ALT SERPL-CCNC: 15 U/L (ref 2–50)
AMYLASE SERPL-CCNC: 44 U/L (ref 20–103)
ANION GAP SERPL CALC-SCNC: 12 MMOL/L (ref 7–16)
AST SERPL-CCNC: 19 U/L (ref 12–45)
BILIRUB SERPL-MCNC: 0.3 MG/DL (ref 0.1–1.5)
BUN SERPL-MCNC: 6 MG/DL (ref 8–22)
CALCIUM ALBUM COR SERPL-MCNC: 9.4 MG/DL (ref 8.5–10.5)
CALCIUM SERPL-MCNC: 9.7 MG/DL (ref 8.5–10.5)
CHLORIDE SERPL-SCNC: 106 MMOL/L (ref 96–112)
CO2 SERPL-SCNC: 23 MMOL/L (ref 20–33)
CREAT SERPL-MCNC: 0.66 MG/DL (ref 0.5–1.4)
GFR SERPLBLD CREATININE-BSD FMLA CKD-EPI: 129 ML/MIN/1.73 M 2
GLOBULIN SER CALC-MCNC: 3.1 G/DL (ref 1.9–3.5)
GLUCOSE SERPL-MCNC: 108 MG/DL (ref 65–99)
LIPASE SERPL-CCNC: 23 U/L (ref 11–82)
POTASSIUM SERPL-SCNC: 3.8 MMOL/L (ref 3.6–5.5)
PROT SERPL-MCNC: 7.5 G/DL (ref 6–8.2)
SODIUM SERPL-SCNC: 141 MMOL/L (ref 135–145)

## 2025-02-06 PROCEDURE — 83690 ASSAY OF LIPASE: CPT

## 2025-02-06 PROCEDURE — 80053 COMPREHEN METABOLIC PANEL: CPT

## 2025-02-06 PROCEDURE — RXMED WILLOW AMBULATORY MEDICATION CHARGE: Performed by: NURSE PRACTITIONER

## 2025-02-06 PROCEDURE — 82150 ASSAY OF AMYLASE: CPT

## 2025-02-06 PROCEDURE — 36415 COLL VENOUS BLD VENIPUNCTURE: CPT

## 2025-02-07 ENCOUNTER — PHARMACY VISIT (OUTPATIENT)
Dept: PHARMACY | Facility: MEDICAL CENTER | Age: 20
End: 2025-02-07
Payer: COMMERCIAL

## 2025-02-13 ENCOUNTER — APPOINTMENT (OUTPATIENT)
Dept: PHYSICAL THERAPY | Facility: REHABILITATION | Age: 20
End: 2025-02-13
Attending: STUDENT IN AN ORGANIZED HEALTH CARE EDUCATION/TRAINING PROGRAM
Payer: COMMERCIAL

## 2025-02-13 NOTE — OP THERAPY DAILY TREATMENT
Outpatient Physical Therapy  DAILY TREATMENT     [facility] Physical Therapy [address].  [address]  [place] [address]  Phone:  [phone]  Fax:  [phone]    Date: 2025    Patient: Eva Washington  YOB: 2005  MRN: [id number]     Time Calculation                   Chief Complaint: No chief complaint on file.    Visit #: 17    SUBJECTIVE:   Some soreness the last week since DC from boot. Wore a shoe today and was a little swollen. Reported it was achey but later called it a 9/10. Been complaint with HEP. Walking is a little better.    OBJECTIVE:  Current objective measures:     Ankle AROM/P  R/L  DF: 0/12;       PN   TU.7 CGA  5STS: 15.31    Left foot: good wound healing noted on medial gastroc and lateral ankle. One small open area on dorsum of great toe. Reiterated importance of keeping clean, dry and instructed to discontinue the Vaseline she was putting on due to open sore. Mild redness at dorsum around open area but not warm     Lt Great toe/toes ext trace, toe flexion trace    2/5  TUG 15.35 CGA  5STS 11.21 BUE supp  No UE 12.11    Gait no boot : constnatly reaching for wall, items, leaning into therapist with close SBA. One min A for LOB.  MiniBest Test    Anticipatory  STS: 2  Rise to Toes:1  SLS: Left 1. 4 2. 2 Right. 1. 2 2.  3 score 1  Subscore 4 /6    Reactive Postural Control*unsafe at this  Compensatory Stepping Correction-Forward:  Compensatory Stepping Correction-Backwards:  Compensatory Stepping Correction-Lateral: Left:  Right:  Subscore 0/6    Sensory Orientation:  Stance (feet together) Eyes open, Firm surface:2  Stance (feet together) Eyes closed, Foam Surface:1  Incline -eyes closed:2  Subscore 5 /6    Dynamic Gait  Changes in gait speed:1  Walk with head turns-horizontal:1 sig   Walk with pivot turns: 1 > 3 sec  Step over obstacles: 0 unable d/t safety   TUG:  Tug CO (counting backwards by 2s from 100) 2    Subscore 5  "/10    14/28    Less than 21 indicates increased risk of falling     Therapeutic Exercises (CPT 78084):     1. bridge, 2 x 15 NT    2. goal reassement see objective    3. reiterated importance of returning to HEP. dec pushing logan hard walking as she is pushing to 9/10 pain    4. 5STS, TUG    5. STS from 18\" table no UE, 2 x 15 SBA nT, sig fatigue    6. BLE shuttle, NT, 6 cords    7. SL, NT, cued with breath coordination, sig inc in fatigue, required inc rest break before leaving.    9. standing hip abd, NT, x5    10. lateral stepping, NT, [id number] ea side    11. Prone hip flex st, NT    12. Prone hip extension, NT    13. Prone superwoman, NT    19. \, last PN: 2/5      Therapeutic Exercise Summary: Ankle AROM/P  R/L  DF: 0/12; 4/12      Therapeutic Treatments and Modalities:     1. Neuromuscular Re-education (CPT 66168), see below    Therapeutic Treatment and Modalities Summary: Reactive balance training using litegait BWS for safety     Time-based treatments/modalities:       ASSESSMENT:   Response to treatment:      PLAN/RECOMMENDATIONS:   Plan for treatment: therapy treatment to continue next visit.  Planned interventions for next visit: continue with current treatment.     Reactive balance, tug o war?    "

## 2025-02-21 ENCOUNTER — PHYSICAL THERAPY (OUTPATIENT)
Dept: PHYSICAL THERAPY | Facility: REHABILITATION | Age: 20
End: 2025-02-21
Payer: COMMERCIAL

## 2025-02-21 DIAGNOSIS — Z15.89 MONOALLELIC MUTATION OF SPTAN1 GENE: ICD-10-CM

## 2025-02-21 DIAGNOSIS — R26.9 GAIT DIFFICULTY: ICD-10-CM

## 2025-02-21 DIAGNOSIS — M62.81 MUSCLE WEAKNESS (GENERALIZED): ICD-10-CM

## 2025-02-21 DIAGNOSIS — G11.4 AUTOSOMAL DOMINANT SPASTIC PARAPLEGIA (HCC): ICD-10-CM

## 2025-02-21 PROCEDURE — 97112 NEUROMUSCULAR REEDUCATION: CPT

## 2025-02-21 PROCEDURE — 97110 THERAPEUTIC EXERCISES: CPT

## 2025-02-21 NOTE — OP THERAPY DAILY TREATMENT
Outpatient Physical Therapy  DAILY TREATMENT     [facility] Physical [address].  [address]  [place] [address]  Phone:  [phone]  Fax:  [phone]    Date: 2025    Patient: Eva Washington  YOB: 2005  MRN: [id number]     Time Calculation    Start time: 1500  Stop time: 1545 Time Calculation (min): 45 minutes         Chief Complaint: Difficulty Walking and Loss Of Balance    Visit #: 17    SUBJECTIVE:   Pt reports 11-2PM this morning, doing standing stairs while at clinic.    Pain: 9/10 today d/t inc walking , standing & stairs.   Hasn't been working on HEP.   Requires HHA to walk in/out of session to ramp outside of clinic.    OBJECTIVE:  Current objective measures:   B Quads  2/: 1 catch with easily moving through full ROM  Ankle AROM/P  R/L  DF: 0/12; 4/12      PN 1/7  TU.7 CGA  5STS: 15.31    Left foot: good wound healing noted on medial gastroc and lateral ankle. One small open area on dorsum of great toe. Reiterated importance of keeping clean, dry and instructed to discontinue the Vaseline she was putting on due to open sore. Mild redness at dorsum around open area but not warm     Lt Great toe/toes ext trace, toe flexion trace    2/5  TUG 15.35 CGA  5STS 11.21 BUE supp  No UE 12.11    Gait no boot : constnatly reaching for wall, items, leaning into therapist with close SBA. One min A for LOB.  MiniBest Test    Anticipatory  STS: 2  Rise to Toes:1  SLS: Left 1. 4 2. 2 Right. 1. 2 2.  3 score 1  Subscore 4 /6    Reactive Postural Control*unsafe at this  Compensatory Stepping Correction-Forward:  Compensatory Stepping Correction-Backwards:  Compensatory Stepping Correction-Lateral: Left:  Right:  Subscore 0/6    Sensory Orientation:  Stance (feet together) Eyes open, Firm surface:2  Stance (feet together) Eyes closed, Foam Surface:1  Incline -eyes closed:2  Subscore 5 /6    Dynamic Gait  Changes in gait speed:1  Walk with head turns-horizontal:1  "sig   Walk with pivot turns: 1 > 3 sec  Step over obstacles: 0 unable d/t safety   TUG:  Tug CO (counting backwards by 2s from 100) 2    Subscore 5 /10    14/28    Less than 21 indicates increased risk of falling     Therapeutic Exercises (CPT 36445):     1. bridge, HEP    2. Hooklying hip abd, 15 x 2, orange    3. Supine gastroc iso, 5iso x 10, decr pain to 7/10    5. STS from 18\" table no UE, HEP, sig fatigue    6. BLE shuttle, deferred d/t calf pain, 6 cords    7. SL, NT    9. standing hip abd, deferred d/t calf pain, unable d/t calf pain    10. lateral stepping, deferred d/t calf pain, unable d/t calf pain    11. Prone hip flex st, 90sec x 1 ea from therapist    12. Prone hip extension, 5iso hold, x 7 reps ea    13. Prone superwoman, x60sec, x25sec    19. \, last PN: 2/      Therapeutic Exercise Summary: Ankle AROM/P  R/L  DF: 0/12; 4/      Therapeutic Treatments and Modalities:     1. Neuromuscular Re-education (CPT 52107), see below    Therapeutic Treatment and Modalities Summary: NMR:   Reinforced/discussed goals/ limitations to participating in HEP.  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  Reports diff falling asleep also wakes and will have diff returning to sleep.  Goal set to improve LE strength and stability with gait and compliance to HEP or start using walker to improve gait quality, decr calf pain and inc freq of amb at home to be met in 2 weeks.       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  Does demo fair/core and LE strength with HEP, low compliance d/t psychosocial factors I.e. changing/being off psych " meds      PLAN/RECOMMENDATIONS:   Plan for treatment: therapy treatment to continue next visit.  Planned interventions for next visit: continue with current treatment.     Reactive balance, tug o war?

## 2025-02-25 ENCOUNTER — PHYSICAL THERAPY (OUTPATIENT)
Dept: PHYSICAL THERAPY | Facility: REHABILITATION | Age: 20
End: 2025-02-25
Payer: COMMERCIAL

## 2025-02-25 DIAGNOSIS — Z15.89 MONOALLELIC MUTATION OF SPTAN1 GENE: ICD-10-CM

## 2025-02-25 DIAGNOSIS — M62.81 MUSCLE WEAKNESS (GENERALIZED): ICD-10-CM

## 2025-02-25 DIAGNOSIS — R26.9 GAIT DIFFICULTY: ICD-10-CM

## 2025-02-25 DIAGNOSIS — G11.4 AUTOSOMAL DOMINANT SPASTIC PARAPLEGIA (HCC): ICD-10-CM

## 2025-02-25 PROCEDURE — 97112 NEUROMUSCULAR REEDUCATION: CPT

## 2025-02-25 PROCEDURE — 97110 THERAPEUTIC EXERCISES: CPT

## 2025-02-25 NOTE — OP THERAPY DAILY TREATMENT
Outpatient Physical Therapy  DAILY TREATMENT     [facility] Physical Therapy [address].  [address]  [place] [address]  Phone:  [phone]  Fax:  [phone]    Date: 2025    Patient: Eva Washington  YOB: 2005  MRN: [id number]     Time Calculation    Start time: 1401  Stop time: 1457 Time Calculation (min): 56 minutes         Chief Complaint: Loss Of Balance and Weakness    Visit #: 18    SUBJECTIVE:   States doing HEP, pain has been better. 4/10.  Has been off psych meds x 6 weeks, is feeling more depressed. Denies any plans to hurt herself or others.    OBJECTIVE:  Current objective measures:   B Quads  : 1 catch with easily moving through full ROM  Ankle AROM/P  R/L  DF: 0/12;       PN   TU.7 CGA  5STS: 15.31    Left foot: good wound healing noted on medial gastroc and lateral ankle. One small open area on dorsum of great toe. Reiterated importance of keeping clean, dry and instructed to discontinue the Vaseline she was putting on due to open sore. Mild redness at dorsum around open area but not warm     Lt Great toe/toes ext trace, toe flexion trace    2/5  TUG 15.35 CGA  5STS 11.21 BUE supp  No UE 12.11    Gait no boot : constnatly reaching for wall, items, leaning into therapist with close SBA. One min A for LOB.  MiniBest Test    Anticipatory  STS: 2  Rise to Toes:1  SLS: Left 1. 4 2. 2 Right. 1. 2 2.  3 score 1  Subscore 4 /6    Reactive Postural Control*unsafe at this  Compensatory Stepping Correction-Forward:  Compensatory Stepping Correction-Backwards:  Compensatory Stepping Correction-Lateral: Left:  Right:  Subscore 0/6    Sensory Orientation:  Stance (feet together) Eyes open, Firm surface:2  Stance (feet together) Eyes closed, Foam Surface:1  Incline -eyes closed:2  Subscore 5 /6    Dynamic Gait  Changes in gait speed:1  Walk with head turns-horizontal:1 sig   Walk with pivot turns: 1 > 3 sec  Step over obstacles: 0 unable d/t safety  "  TUG:  Tug CO (counting backwards by 2s from 100) 2    Subscore 5 /10    14/28    Less than 21 indicates increased risk of falling     Therapeutic Exercises (CPT 64246):     1. shuttle squats, 3 x 20, 5 cords    2. shuttle SL squats, 3 x 15 B, 3 cords    5. STS from 18\" table no UE, HEP, sig fatigue    7. SL, NT    9. standing hip abd, deferred d/t calf pain, unable d/t calf pain    10. lateral stepping, deferred d/t calf pain, unable d/t calf pain    11. Prone hip flex st, NT    12. Prone hip extension, NT    13. Prone superwoman, NT    19. \, last PN: 2      Therapeutic Exercise Summary: Ankle AROM/P  R/L  DF: 0;       Therapeutic Treatments and Modalities:     1. Neuromuscular Re-education (CPT 47691), see below    Therapeutic Treatment and Modalities Summary: NMR:   Slip trip training/reactive balance/stepping strategy utilized litegait harness for safety.  Utilized sheet on slippery surface, pt in standing, and pulled sheet to create a tripping sensation performed x 15 anterior pulls and 15 posterior to simulate slipping. Pt required use of harness more than 75% of the time with max a to return to standing mod max VC to facilitate stepping with some carry over. Rests as needed due to fatigue    For cont stepping strategy/ reactive balance using lite gait performed tug o war with therapist SBA and tech applying varying amount of force to facilitate forward and backward stepping. Performed 4 x 4 min bouts, standing breaks between and one sitting break    HHA from [employer] 1 min A for tripping    Time-based treatments/modalities:    Physical Therapy Timed Treatment Charges  Neuromusc re-ed, balance, coor, post minutes (CPT 75854): 42 minutes  Therapeutic exercise minutes (CPT 19010): 14 minutes  ASSESSMENT:   Response to treatment:  Significantly challenged with reactive balance traiinng utilizing harness support frequently. Did demo some improvement overall with stepping typically requiring max " vc to encourage the step.  Encouraged follow up with mental health provider and primary in regards to lack of medication x 6 weeks. Pt denies any plans of harming herself or others but notes being more depressed.     PLAN/RECOMMENDATIONS:   Plan for treatment: therapy treatment to continue next visit.  Planned interventions for next visit: continue with current treatment.     Reactive balance, tug o war?

## 2025-03-02 ENCOUNTER — HOSPITAL ENCOUNTER (OUTPATIENT)
Dept: RADIOLOGY | Facility: MEDICAL CENTER | Age: 20
End: 2025-03-02
Attending: NURSE PRACTITIONER
Payer: COMMERCIAL

## 2025-03-02 DIAGNOSIS — R11.2 MILD NAUSEA AND VOMITING: ICD-10-CM

## 2025-03-02 PROCEDURE — 700117 HCHG RX CONTRAST REV CODE 255: Performed by: NURSE PRACTITIONER

## 2025-03-02 PROCEDURE — 74177 CT ABD & PELVIS W/CONTRAST: CPT

## 2025-03-02 RX ADMIN — IOHEXOL 25 ML: 240 INJECTION, SOLUTION INTRATHECAL; INTRAVASCULAR; INTRAVENOUS; ORAL at 13:10

## 2025-03-02 RX ADMIN — IOHEXOL 100 ML: 350 INJECTION, SOLUTION INTRAVENOUS at 13:10

## 2025-03-03 ENCOUNTER — PHARMACY VISIT (OUTPATIENT)
Dept: PHARMACY | Facility: MEDICAL CENTER | Age: 20
End: 2025-03-03
Payer: COMMERCIAL

## 2025-03-03 PROCEDURE — RXMED WILLOW AMBULATORY MEDICATION CHARGE: Performed by: NURSE PRACTITIONER

## 2025-03-07 ENCOUNTER — APPOINTMENT (OUTPATIENT)
Dept: MEDICAL GROUP | Facility: CLINIC | Age: 20
End: 2025-03-07
Payer: COMMERCIAL

## 2025-03-14 ENCOUNTER — APPOINTMENT (OUTPATIENT)
Dept: PHYSICAL THERAPY | Facility: REHABILITATION | Age: 20
End: 2025-03-14
Payer: COMMERCIAL

## 2025-03-17 ENCOUNTER — PHYSICAL THERAPY (OUTPATIENT)
Dept: PHYSICAL THERAPY | Facility: REHABILITATION | Age: 20
End: 2025-03-17
Attending: STUDENT IN AN ORGANIZED HEALTH CARE EDUCATION/TRAINING PROGRAM
Payer: COMMERCIAL

## 2025-03-17 DIAGNOSIS — M62.81 MUSCLE WEAKNESS (GENERALIZED): ICD-10-CM

## 2025-03-17 DIAGNOSIS — G11.4 AUTOSOMAL DOMINANT SPASTIC PARAPLEGIA (HCC): ICD-10-CM

## 2025-03-17 DIAGNOSIS — Z15.89 MONOALLELIC MUTATION OF SPTAN1 GENE: ICD-10-CM

## 2025-03-17 DIAGNOSIS — R26.9 GAIT DIFFICULTY: ICD-10-CM

## 2025-03-17 PROCEDURE — 97112 NEUROMUSCULAR REEDUCATION: CPT

## 2025-03-17 NOTE — OP THERAPY DAILY TREATMENT
"  Outpatient Physical Therapy  DAILY TREATMENT     [facility] Physical Therapy [address].  [address]  [place] [address]  Phone:  [phone]  Fax:  [phone]    Date: 2025    Patient: Eva Washington  YOB: 2005  MRN: [id number]     Time Calculation    Start time: 1545  Stop time: 1627 Time Calculation (min): 42 minutes         Chief Complaint: Difficulty Walking and Loss Of Balance    Visit #: 19    SUBJECTIVE:   Foot pain much better, none in the last week.    Feels closer to pre surgery. But lacks confidence related to her balance. When asked goals wants to be pre \"all this\" meaning gait changes    Is taking her psych meds, new  Has been helpful.    Brings up AFOs but not interested,     OBJECTIVE:  Current objective measures:   B Quads  /: 1 catch with easily moving through full ROM  Ankle AROM/P  R/L  DF: 0/12; 4      PN 1  TU.7 CGA  5STS: 15.31    Left foot: good wound healing noted on medial gastroc and lateral ankle. One small open area on dorsum of great toe. Reiterated importance of keeping clean, dry and instructed to discontinue the Vaseline she was putting on due to open sore. Mild redness at dorsum around open area but not warm     Lt Great toe/toes ext trace, toe flexion trace    2/5  TUG 15.35 CGA  5STS 11.21 BUE supp  No UE 12.11    Gait no boot : constnatly reaching for wall, items, leaning into therapist with close SBA. One min A for LOB.  MiniBest Test    Anticipatory  STS: 2  Rise to Toes:1  SLS: Left 1. 4 2. 2 Right. 1. 2 2.  3 score 1  Subscore 4 /6    Reactive Postural Control*unsafe at this  Compensatory Stepping Correction-Forward:  Compensatory Stepping Correction-Backwards:  Compensatory Stepping Correction-Lateral: Left:  Right:  Subscore 0/6    Sensory Orientation:  Stance (feet together) Eyes open, Firm surface:2  Stance (feet together) Eyes closed, Foam Surface:1  Incline -eyes closed:2  Subscore 5 /6    Dynamic " "Gait  Changes in gait speed:1  Walk with head turns-horizontal:1 sig   Walk with pivot turns: 1 > 3 sec  Step over obstacles: 0 unable d/t safety   TUG:  Tug CO (counting backwards by 2s from 100) 2    Subscore 5 /10    14/28    Less than 21 indicates increased risk of falling     3/  5STS no UE 13.26 (1st attempt total a to prevent fall on rep 2.)  TUG 23.22  TUG 17.81    TUG B AFOS 19.51, 2. 20.23 (some improved toe clearance)  6MWT AFOs rests 2-20 standing, seated 445-5:50  2 total A, early in test, 1 mod A, VC with determination of gait mechanics to slow down/stabilize.  354 feet mod RPE 4/10 visible fatigued/out of breath previous 420 pre surgery    MiniBest Test    Anticipatory  STS:2  Rise to Toes:1  SLS: Left 1.1 2. 2 Right. 1. 2 2. 2 score 1  Subscore  4/6    Reactive Postural Control  Compensatory Stepping Correction-Forward:0  Compensatory Stepping Correction-Backwards:0  Compensatory Stepping Correction-Lateral: Left: 0 Right:0  Subscore 0/6    Sensory Orientation:  Stance (feet together) Eyes open, Firm surface:2  Stance (feet together) Eyes closed, Foam Surface:1  Incline -eyes closed:2  Subscore 5 /6    Dynamic Gait  Changes in gait speed:1  Walk with head turns-horizontal:1  Walk with pivot turns:1  Step over obstacles: 0  TUG:  Tug CO    Subscore 4 /10    13/28    Less than 21 indicates increased risk of falling     Therapeutic Exercises (CPT 79255):     1. shuttle squats, 3 x 20, 5 cords    2. shuttle SL squats, 3 x 15 B, 3 cords    5. STS from 18\" table no UE, HEP, sig fatigue    7. SL, NT    9. standing hip abd, deferred d/t calf pain, unable d/t calf pain    10. lateral stepping, deferred d/t calf pain, unable d/t calf pain    11. Prone hip flex st, NT    12. Prone hip extension, NT    13. Prone superwoman, NT    19. \, last PN: 2/5      Therapeutic Exercise Summary: Ankle AROM/P  R/L  DF: 0/12; 412      Therapeutic Treatments and Modalities:     1. Neuromuscular Re-education (CPT " "72717), see below    Therapeutic Treatment and Modalities Summary: NMR:   Minibest see objective  TUG see objective  5STS see objective  6MWT see objective    Discussion on POC and progress towards goals. Discussed pt specific goals in which her primary goal is to walk \"normal\" therefore time discussing pt expectations vs current progress and limitations associated with her condition    Discussed ways to improve function/energy conservation such as walking with AFOs even performed trials with TUG and 6MWT see objective. However, pt not really on board. And this discussion/trials have been completed frequently during this POC. Also discussed trial of walking with a trekking pole of single UE AD as pt is still very high fall risk and requires HHA during sessions or max A to prevent falls. Again, she is reluctant      Time-based treatments/modalities:    Physical Therapy Timed Treatment Charges  Neuromusc re-ed, balance, coor, post minutes (CPT 56828): 42 minutes  ASSESSMENT:   Response to treatment:  Pt has only completed 2 follow ups since last PN on 2/5 and not seen since 2/25. 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  Since surgery pt gait has not returned to baseline where she consistently needs hand held assist to ambulate short periods in the clinic due to fall risk and occ tripping requiring max A for recovery due to impaired reactionary balance. This is also supported by assessment with limited change in TUG, 5STS and 6MWT as compared to pre surgery although improved last PN or two. Time spent addressing other barriers that may assist with mobility such as use of AFOs or assistive device but pt currently against any use. With AFOs today only min change in mobilty. However, did have multiple max A when ambualting end " of session and during 6MWT wihtout AD. Pt overall reluctant to agree to use of AD or AFO at this time and has been discussed previously in the POC. She will cont to benefit from skilled PT 2 x 8 weeks to dec fall risk, improve balance, and maximize overall pt function.     Short Term Goals:   Pt will be compliant with HEP progressing  Pt will improve miniBEST by 4 MCID to decr risk for falls cont not met  pt will report pain while walking in boot 5/10 or better consistently.  Pt will demonstrate full toe flexion/ext AROM to improve gait with pain less than 1/10. met  Short term goal time span:  2-4 weeks      Long Term Goals:    Pt will be able to demo improvement 5xSTS <12sec met with and without UE  not met cont  Pt will be able to improvement with 6MWT by 200ft not met cont  Pt will be able to demo improvement on miniBEST >21, low fall risk not met cont  Pt will demonstrate ability to ambulate without boot, when cleared, 150 feet without LOB to dec fall risk cont-no longer in boot but inconsistent performance with walking SPV, HHA, max A  Long term goal time span:  6-8 weeks       PLAN/RECOMMENDATIONS:   Plan for treatment: therapy treatment to continue next visit.  Planned interventions for next visit: continue with current treatment.    Hiking stick/dowel gait. 6MWT no AFOs

## 2025-03-18 ENCOUNTER — TELEPHONE (OUTPATIENT)
Dept: NEUROLOGY | Facility: MEDICAL CENTER | Age: 20
End: 2025-03-18
Payer: COMMERCIAL

## 2025-03-18 DIAGNOSIS — G43.019 INTRACTABLE MIGRAINE WITHOUT AURA AND WITHOUT STATUS MIGRAINOSUS: ICD-10-CM

## 2025-03-19 ENCOUNTER — HOSPITAL ENCOUNTER (OUTPATIENT)
Dept: LAB | Facility: MEDICAL CENTER | Age: 20
End: 2025-03-19
Payer: COMMERCIAL

## 2025-03-19 ENCOUNTER — TELEMEDICINE (OUTPATIENT)
Dept: NEUROLOGY | Facility: MEDICAL CENTER | Age: 20
End: 2025-03-19
Attending: STUDENT IN AN ORGANIZED HEALTH CARE EDUCATION/TRAINING PROGRAM
Payer: COMMERCIAL

## 2025-03-19 VITALS — WEIGHT: 154 LBS | HEIGHT: 65 IN | BODY MASS INDEX: 25.66 KG/M2

## 2025-03-19 DIAGNOSIS — Z91.81 FALLS INFREQUENTLY: ICD-10-CM

## 2025-03-19 DIAGNOSIS — G47.00 INSOMNIA, UNSPECIFIED TYPE: ICD-10-CM

## 2025-03-19 DIAGNOSIS — M62.451 CONTRACTURE OF BOTH HAMSTRINGS: ICD-10-CM

## 2025-03-19 DIAGNOSIS — F41.8 ANXIETY WITH DEPRESSION: ICD-10-CM

## 2025-03-19 DIAGNOSIS — M62.81 MUSCLE WEAKNESS (GENERALIZED): ICD-10-CM

## 2025-03-19 DIAGNOSIS — Z15.89 MONOALLELIC MUTATION OF SPTAN1 GENE: ICD-10-CM

## 2025-03-19 DIAGNOSIS — G11.4 AUTOSOMAL DOMINANT SPASTIC PARAPLEGIA (HCC): ICD-10-CM

## 2025-03-19 DIAGNOSIS — R25.2 SPASTICITY: ICD-10-CM

## 2025-03-19 DIAGNOSIS — F33.1 MODERATE EPISODE OF RECURRENT MAJOR DEPRESSIVE DISORDER (HCC): ICD-10-CM

## 2025-03-19 DIAGNOSIS — G11.9 CEREBELLAR ATAXIA (HCC): ICD-10-CM

## 2025-03-19 DIAGNOSIS — M62.452 CONTRACTURE OF BOTH HAMSTRINGS: ICD-10-CM

## 2025-03-19 DIAGNOSIS — G43.019 INTRACTABLE MIGRAINE WITHOUT AURA AND WITHOUT STATUS MIGRAINOSUS: ICD-10-CM

## 2025-03-19 LAB
25(OH)D3 SERPL-MCNC: 20 NG/ML (ref 30–100)
ALBUMIN SERPL BCP-MCNC: 4.5 G/DL (ref 3.2–4.9)
ALBUMIN/GLOB SERPL: 1.3 G/DL
ALP SERPL-CCNC: 125 U/L (ref 30–99)
ALT SERPL-CCNC: 16 U/L (ref 2–50)
ANION GAP SERPL CALC-SCNC: 12 MMOL/L (ref 7–16)
AST SERPL-CCNC: 21 U/L (ref 12–45)
BASOPHILS # BLD AUTO: 0.8 % (ref 0–1.8)
BASOPHILS # BLD: 0.08 K/UL (ref 0–0.12)
BILIRUB SERPL-MCNC: 0.3 MG/DL (ref 0.1–1.5)
BUN SERPL-MCNC: 9 MG/DL (ref 8–22)
CALCIUM ALBUM COR SERPL-MCNC: 9.3 MG/DL (ref 8.5–10.5)
CALCIUM SERPL-MCNC: 9.7 MG/DL (ref 8.5–10.5)
CHLORIDE SERPL-SCNC: 106 MMOL/L (ref 96–112)
CHOLEST SERPL-MCNC: 165 MG/DL (ref 100–199)
CO2 SERPL-SCNC: 24 MMOL/L (ref 20–33)
CREAT SERPL-MCNC: 0.65 MG/DL (ref 0.5–1.4)
EOSINOPHIL # BLD AUTO: 0.04 K/UL (ref 0–0.51)
EOSINOPHIL NFR BLD: 0.4 % (ref 0–6.9)
ERYTHROCYTE [DISTWIDTH] IN BLOOD BY AUTOMATED COUNT: 40.6 FL (ref 35.9–50)
EST. AVERAGE GLUCOSE BLD GHB EST-MCNC: 108 MG/DL
FERRITIN SERPL-MCNC: 16.4 NG/ML (ref 10–291)
GFR SERPLBLD CREATININE-BSD FMLA CKD-EPI: 130 ML/MIN/1.73 M 2
GLOBULIN SER CALC-MCNC: 3.4 G/DL (ref 1.9–3.5)
GLUCOSE SERPL-MCNC: 88 MG/DL (ref 65–99)
HBA1C MFR BLD: 5.4 % (ref 4–5.6)
HCT VFR BLD AUTO: 42.6 % (ref 37–47)
HCV AB SER QL: NORMAL
HDLC SERPL-MCNC: 51 MG/DL
HGB BLD-MCNC: 14.1 G/DL (ref 12–16)
HIV 1+2 AB+HIV1 P24 AG SERPL QL IA: NORMAL
IMM GRANULOCYTES # BLD AUTO: 0.02 K/UL (ref 0–0.11)
IMM GRANULOCYTES NFR BLD AUTO: 0.2 % (ref 0–0.9)
IRON SATN MFR SERPL: 15 % (ref 15–55)
IRON SERPL-MCNC: 56 UG/DL (ref 40–170)
LDLC SERPL CALC-MCNC: 106 MG/DL
LYMPHOCYTES # BLD AUTO: 2.58 K/UL (ref 1–4.8)
LYMPHOCYTES NFR BLD: 26.8 % (ref 22–41)
MCH RBC QN AUTO: 28.3 PG (ref 27–33)
MCHC RBC AUTO-ENTMCNC: 33.1 G/DL (ref 32.2–35.5)
MCV RBC AUTO: 85.4 FL (ref 81.4–97.8)
MONOCYTES # BLD AUTO: 0.57 K/UL (ref 0–0.85)
MONOCYTES NFR BLD AUTO: 5.9 % (ref 0–13.4)
NEUTROPHILS # BLD AUTO: 6.32 K/UL (ref 1.82–7.42)
NEUTROPHILS NFR BLD: 65.9 % (ref 44–72)
NRBC # BLD AUTO: 0 K/UL
NRBC BLD-RTO: 0 /100 WBC (ref 0–0.2)
PLATELET # BLD AUTO: 377 K/UL (ref 164–446)
PMV BLD AUTO: 9.8 FL (ref 9–12.9)
POTASSIUM SERPL-SCNC: 4 MMOL/L (ref 3.6–5.5)
PROT SERPL-MCNC: 7.9 G/DL (ref 6–8.2)
RBC # BLD AUTO: 4.99 M/UL (ref 4.2–5.4)
SODIUM SERPL-SCNC: 142 MMOL/L (ref 135–145)
TIBC SERPL-MCNC: 368 UG/DL (ref 250–450)
TRIGL SERPL-MCNC: 42 MG/DL (ref 0–149)
TSH SERPL DL<=0.005 MIU/L-ACNC: 1.08 UIU/ML (ref 0.38–5.33)
UIBC SERPL-MCNC: 312 UG/DL (ref 110–370)
WBC # BLD AUTO: 9.6 K/UL (ref 4.8–10.8)

## 2025-03-19 PROCEDURE — 83540 ASSAY OF IRON: CPT

## 2025-03-19 PROCEDURE — 80061 LIPID PANEL: CPT

## 2025-03-19 PROCEDURE — 85610 PROTHROMBIN TIME: CPT

## 2025-03-19 PROCEDURE — 82306 VITAMIN D 25 HYDROXY: CPT

## 2025-03-19 PROCEDURE — 80053 COMPREHEN METABOLIC PANEL: CPT

## 2025-03-19 PROCEDURE — 83550 IRON BINDING TEST: CPT

## 2025-03-19 PROCEDURE — 85025 COMPLETE CBC W/AUTO DIFF WBC: CPT

## 2025-03-19 PROCEDURE — 84443 ASSAY THYROID STIM HORMONE: CPT

## 2025-03-19 PROCEDURE — 82728 ASSAY OF FERRITIN: CPT

## 2025-03-19 PROCEDURE — 87389 HIV-1 AG W/HIV-1&-2 AB AG IA: CPT

## 2025-03-19 PROCEDURE — 86803 HEPATITIS C AB TEST: CPT

## 2025-03-19 PROCEDURE — 83036 HEMOGLOBIN GLYCOSYLATED A1C: CPT

## 2025-03-19 PROCEDURE — 85730 THROMBOPLASTIN TIME PARTIAL: CPT

## 2025-03-19 PROCEDURE — 99213 OFFICE O/P EST LOW 20 MIN: CPT | Mod: 95 | Performed by: STUDENT IN AN ORGANIZED HEALTH CARE EDUCATION/TRAINING PROGRAM

## 2025-03-19 PROCEDURE — 36415 COLL VENOUS BLD VENIPUNCTURE: CPT

## 2025-03-19 RX ORDER — CITALOPRAM HYDROBROMIDE 20 MG/1
20 TABLET ORAL
COMMUNITY
Start: 2025-03-13

## 2025-03-19 RX ORDER — LAMOTRIGINE 25 MG/1
50 TABLET ORAL
COMMUNITY
Start: 2025-03-13

## 2025-03-19 ASSESSMENT — PATIENT HEALTH QUESTIONNAIRE - PHQ9
SUM OF ALL RESPONSES TO PHQ QUESTIONS 1-9: 17
5. POOR APPETITE OR OVEREATING: 3 - NEARLY EVERY DAY
CLINICAL INTERPRETATION OF PHQ2 SCORE: 5

## 2025-03-19 NOTE — PATIENT INSTRUCTIONS
NEUROLOGY CLINIC VISIT WITH DR. DE LOS SANTOS     PLEASE READ THIS ENTIRE DOCUMENT CAREFULLY AND COMPLETELY:    First and foremost, you matter to Dr. De Los Santos and you deserve the best care.   Dr. De Los Santos prides himself on providing the best possible care to all his patients. He strives to make each appointment meaningful, so that all your concerns are being addressed and all your neurological problems are being optimally treated. In order to achieve these goals for everyone, Dr. De Los Santos has listed important reminders and the best ways to prepare for each appointment. Please read each item carefully. Thank you!    Due to the high volume of patients we are trying to help, your physician will not be able to respond by phone or in [employer] to your routine concerns between appointments.  This does not reflect a lack of interest or concern for you or your diagnosis.  Please bring these questions and concerns to your appointment where your physician can answer.  Please relay more pressing concerns to our office, either via [employer], or by phone; if not able to reach us please visit nearby Urgent Care Center or Emergency Department.  If any emergent medical needs, please seek emergent medical help and/or call 911.    Also, please note that we are not able to fill out paperwork that might be related to your work, utility company, disability, and/or driving, among others, in between the visits.  Please schedule a dedicated appointment to address any and all paperwork.  This is not due to lack of concern or interest for your disease-related work/administrative problems, but to make sure that we provide the best possible care and to fill out your paperwork in a correct, complete, and timely manner.  ------------------------------------------------------------------------------------------  Please let our office know if you have any changes in your seizure frequency and/characteristics.     Please keep a diary of your seizures and bring it  with you to each appointment.    Please take vitamin D3 [id number] internation units daily.     Please abstain from driving until further notice    If you are a biological female with epilepsy who is of reproductive age, who is actively breastfeeding, and/or who infants/young children:  Please take folic acid 1 mg daily. This is an over-the-counter supplement that is recommended to prevent certain developmental problems in your baby, in case you become pregnant in the future.  It is critical that you let our office know as soon as you become pregnant or plan to become pregnant.  If you are caring for a baby/young child, please make sure to be sitting on a soft surface while holding your baby/young child, so in case you have a seizure, your baby/young child is not injured due to fall.   Please let us know if, while breastfeeding, you observe that your baby is excessively sleepy and/or has other behavioral changes. Because many antiseizure medications are collected in breast milk, some nursing babies can suffer adverse medication effects.    Please note that the following might precipitate seizures:   missed doses of antiseizure medications  being sick with a fever, stress  Fatigue  sleep deprivation or abnormal sleeping patterns  not eating regularly  not drinking enough water  drinking too much alcohol  stopping alcohol suddenly if you are currently using it on a regular/daily basis,   using recreational drugs, among others.    Please note that the following might lead to an injury or even be life-threatening in the event you have a seizure and/or lose awareness while:  being in a large body of water by yourself, such as bath, pool, lake, ocean, among others (risk of drowning)  being on unprotected heights (risk of fall)  being around and/or operating heavy machinery (risk of injury)  being around open fire/hot surfaces (risk of burns)  any other activities/circumstances, in which if you lose awareness, you might  injure yourself and/or others.  -------------------------------------------------------------------------------------------  SUDEP (SUDDEN UNEXPECTED DEATH IN EPILEPSY)  It is important that your seizures are well controlled and you have none or have them rarely. In addition to avoiding injury related to breakthrough seizures, frequent seizures increase risk of SUDEP (sudden unexpected death in epilepsy), where a person goes into a seizure and then never wakes up. The best way to prevent SUDEP is to control your seizures well.   ------------------------------------------------------------------------------------------  Please call for help (crisis line and/or 911) in case you have thoughts of harming yourself and/or others.  ------------------------------------------------------------------------------------------  INSTRUCTIONS FOR YOUR FAMILY/CAREGIVERS:  Please call 911 if the patient has a seizure longer than 2-3 minutes, if seizures are back to back without her recovering to her baseline, or she does not start recovering within 5-10 minutes after the seizure stops. During the seizure - please turn her on her side, please make sure her head is protected (for example, you should put a pillow under her head, if one is available), and please do not put anything in her mouth.   ------------------------------------------------------------------------------------------  PATIENT EXPECTATIONS,  IMPORTANT APPOINTMENT REMINDERS, AND ADDITIONAL HELPFUL TIPS:   REFILLS:   Request refills AT LEAST 1 week in advance to ensure you do not run out of medications    MyChart  It is STRONGLY encouraged that ALL patients sign up for MyChart. It is BY FAR the fastest and most convenient way for both Dr. De Los Santos and patients to obtain timely refills.  If you are having trouble signing up or logging into your account, staff are available to help you. Please ask a medical assistant or staff at the  to assist you.    TEST RESULS:    All labs and diagnostic test results will be reviewed at your next visit, UNLESS  Dr. De Los Santos determines that there are important findings on the tests need to be acted on sooner. Dr. De Los Santos will either call or send a message through [employer] if this is the case.    BE PREPARED PRIOR TO EVERY APPOINTMENT:  All patient are responsible for ensuring that ALL test results that were completed outside of the [employer] system have been received by our Neurology Department PRIOR to your appointment with Dr. De Los Santos.    IMPORTANT:  ALL images (not just the reports) must be sent and uploaded to the [employer] system. Dr. De Los Santos reviews all images personally prior to each visit. Ensuring that ALL the test results and test images are accessible to Dr. De Los Santos prior to your appointment is YOUR responsibility and an important part of making the most out of each appointment.   Bring a government-issues picture ID and an updated insurance card EVERY visit.  It is highly recommended that you bring at every visit a list of the most important topics that you want address. While it may not be possible to address all items on the list in a single visit, preparing a list will ensure that Dr. De Los Santos addresses the items that are most important to you and your health    PAPERWORK, DOCUMENTATION, LETTER REQUESTS:  You must notify the office ahead of your appointment of all paperwork or letter requests.   Please DO NOT wait until the last minute to make these requests. Please give all paperwork to the medical assistant at the start of the appointment and check-in process. Please note that Dr. De Los Santos may not be able complete some types of documentation in a single appointment or even within a single day or week. This is why it is important to communicate paperwork requests prior to your appointment and at least 2 weeks prior to any deadlines.    KNOW ALL YOU MEDICATIONS:   AT EVERY SINGLE APPOINTMENT, please bring a list of every single prescribed,  non-prescribed, and over the counter medication or supplements you are taking, including ones taken on a rare or intermittent basis.  Include the following information for each prescribed or non-prescribed medications:  Name of medication   The strength of EACH pill/capsule/tablet, etc.   The number of pills/capsules/tablets, etc taken per dose  The number and time of day that doses are taken  For every single Supplement that you take on a routine or intermittent basis, you must include:  The Brand Name   A complete list of every single ingredient, compound, vitamin, and/or mineral in each dose, along with the corresponding amounts/strengths of all ingredients, vitamins, minerals, etc., if such information is provided or known  The number of doses taken per day and time of day doses are taken  If medications are taken on an intermittent or as needed basis, please estimate how many days per week or days per month the medications are used  DO NOT just print out your medication list from [employer] or bring a list from a prior appointment or hospitalizations because the information is often often unreliable, inaccurate, outdated, and/or incomplete   The list should be printed or written  If you forget or do not have a list of all the medication, then it is acceptable, although less preferred, to bring all the bottles to the appointment     ARRIVE EARLY FOR ALL VISITS:  Please note that we are unable to accommodate late arrivals as per office policy.  YOU-the patient - (NOT a parent, spouse, or friend) must be physically present at check-in no later than 12 minutes after the scheduled appointment time, or you will be asked to reschedule   Consider scheduling a virtual appointment with Dr. De Los Satnos through [employer] as an alternative if transportation to the clinic is difficult or unavailable   Please note, however, that virtual visits can only be scheduled after being an established patient of Dr. De Los Santos. All new appointments  "must be done in-person in clinic  Some insurances will not cover the cost of virtual appointments. Please check with your insurance to find out if these visits are covered    COMMUNICATING URGENT AND NON-URGENT MATTERS:  Your concerns are important and deserve to be heard and addressed. If you have an urgent matter, there are two methods that will ensure your concerns are prioritized appropriately:   Preferred method: Sign-up/Login to your [employer] account and send a message addressed to Dr. De Los Santos or Claudette Ramesh (Dr. De Los Snatos's assistant). In the subject line, type \"urgent\" followed by a word or phrase describing the situation (For example, write \"Urgent: Out of antiseuzre med and need refill\" or \"Urgent: Severe side effects to new meds\". In doing this, our staff can ensure urgent messages are triaged appropriately and communicated to Dr. De Los Santos that day.  Call [facility] main line at [phone]. Dr. De Los Santos's voicemail extension is [phone]. When leaving a voice message, specifically indicate if it is urgent (or non-urgent) so that the matter can be triaged appropriately and addressed in a timely manner    Thank you for entrusting your neurological care to [facility] and we look forward to continuing to serve you.   "

## 2025-03-19 NOTE — PROGRESS NOTES
Telemedicine: Established Patient   This evaluation was conducted via Teams using secure and encrypted videoconferencing technology. The patient was in their home in the [facility].    The patient's identity was confirmed and verbal consent was obtained for this virtual visit.     NEUROLOGY FOLLOW-UP - 03/19/2025     REASON FOR VISIT: Eva Washington 19 y.o. 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   Low normal left ventricular systolic function.  Visually estimated ejection fraction is 50-55 %.  Normal right ventricular size and systolic function.  No significant valvular abnormalities.      MRI Right knee Aug 2024 (Done at [facility]) - no bony abnormality and no internal ligament derangement         PFTs June 2024:                  Clinic Visit September 2024:  She recently had umbilical hernia repair in August 2024.  At that time, I recommend she be referred to PT/OT. These appointment were just scheduled so she has not had a chance to work with PT/OT consistently.  Of most concern is her mental health. She is depressed and has and continues to have thoughts of self-harm. She trie dto harm herself with staples last month. She voluntarily went to commit herself to psychiatry facility. She now sees a therapist weekly and she is seeing a psychiatrist  for the first time today. She is also having close follow-up with her PCP who has been helping with her psychotropic medications in the meantime.   Also ordered PFTs and ordered cardiac work-up given the possible associated between genetic mutation and cardiovascular complications.     INTERVAL HISTORY:      Had her medications adjusted for her mood just a couple of weeks ago    Recovery from surgery has been good. Good symmetrical range of motion and good alignment.    She has been still working with PT. Balance is better and strength is better    CURRENT MEDICATIONS:  Current Outpatient Medications on File Prior to Visit   Medication Sig Dispense Refill    lamoTRIgine (LAMICTAL) 25 MG Tab Take 50 mg by mouth every day.      citalopram (CELEXA) 20 MG Tab Take 20 mg by mouth every day.      ondansetron (ZOFRAN ODT) 8 MG TABLET DISPERSIBLE Take 8 mg by mouth every 12 hours as needed for Nausea.      zolpidem (AMBIEN) 5 MG Tab Take 5 mg by mouth at bedtime as needed for Sleep.      Ubrogepant (UBRELVY) 100 MG Tab Take 1 tablet by mouth as needed (at onset of migraine.  May repeat dose in 2 hours if unrelieved.  Not to exceed 2 tablets in 24 hours.) for up to 30 days. 10 Tablet 5    traZODone (DESYREL) 100 MG Tab Take 100 mg by mouth every evening.      omeprazole (PRILOSEC) 40 MG delayed-release capsule Take 40 mg by mouth every day.         hydrOXYzine pamoate (VISTARIL) 50 MG Cap Take 50 mg by mouth 4 times a day as needed for Anxiety.      Spacer/Aero-Holding Chambers (OPTICHAMBER RUPINDER) Misc 1 EACH ONE TIME FOR 1 DOSE.      albuterol 108 (90 Base) MCG/ACT Aero Soln inhalation aerosol Inhale 2 Puffs every four hours as needed for Shortness of Breath. 1 Each 1    sulfamethoxazole-trimethoprim (BACTRIM DS) 800-160 MG tablet Take 1 Tablet by mouth 2 times a day. (Patient not taking: Reported on 3/19/2025) 14 Tablet 0    carBAMazepine SR (TEGRETOL XR) 100 MG TABLET SR 12 HR Take 100 mg by mouth at bedtime. (Patient  "not taking: Reported on 3/19/2025)      gabapentin (NEURONTIN) 300 MG Cap 1 po q hs prn nerve pain (Patient not taking: Reported on 12/30/2024) 14 Capsule 0    Ferrous Sulfate (IRON PO) Take 1 Tablet by mouth every day. (Patient not taking: Reported on 3/19/2025)       No current facility-administered medications on file prior to visit.        EXAM:   Ht 1.651 m (5' 5\")   Wt 69.9 kg (154 lb)    Wt Readings from Last 5 Encounters:   03/19/25 69.9 kg (154 lb) (84%, Z= 1.01)*   12/31/24 69.9 kg (154 lb) (85%, Z= 1.03)*   12/31/24 69.9 kg (154 lb) (85%, Z= 1.03)*   12/30/24 65.8 kg (145 lb) (77%, Z= 0.75)*   12/10/24 70 kg (154 lb 5.2 oz) (85%, Z= 1.04)*     * Growth percentiles are based on CDC (Girls, 2-20 Years) data.      Physical Exam:  Physical Exam  Constitutional:       General: She is awake. She is not in acute distress.     Appearance: She is not ill-appearing.   Neurological:      Mental Status: She is alert.        Neurological Exam   Neurological Exam  Mental Status  Awake and alert.       ASSESSMENT, EDUCATION, AND COUNSELING:  This is a 19 y.o. female patient who presents to the neurology clinic. We had an extensive discussion about the patient's symptoms, signs, and work-up to date, if any. We discussed potential and/or definitive diagnoses, work-up, and potential treatments.     PLAN:  Medications administered in today's encounter if applicable:       If applicable, the work-up such as labs, imaging, procedures, and/or other testing, referrals, and/or recommended treatment strategies are listed below.  Orders Placed This Encounter    Patient has been identified as having a positive depression screening. Appropriate orders and counseling have been given.    lamoTRIgine (LAMICTAL) 25 MG Tab    citalopram (CELEXA) 20 MG Tab     Lab Frequency Next Occurrence         Medication List            Accurate as of March 19, 2025 10:04 AM. If you have any questions, ask your nurse or doctor.            "     CONTINUE taking these medications        Instructions   albuterol 108 (90 Base) MCG/ACT Aers inhalation aerosol   Doctor's comments: Give albuterol that is patient or insurance preference please  Inhale 2 Puffs every four hours as needed for Shortness of Breath.  Dose: 2 Puff     carBAMazepine  MG Tb12  Commonly known as: TEGretol XR   Take 100 mg by mouth at bedtime.  Dose: 100 mg     citalopram 20 MG Tabs  Commonly known as: CeleXA   Take 20 mg by mouth every day.  Dose: 20 mg     gabapentin 300 MG Caps  Commonly known as: Neurontin   1 po q hs prn nerve pain     hydrOXYzine pamoate 50 MG Caps  Commonly known as: Vistaril   Take 50 mg by mouth 4 times a day as needed for Anxiety.  Dose: 50 mg     IRON PO   Take 1 Tablet by mouth every day.  Dose: 1 Tablet     lamoTRIgine 25 MG Tabs  Commonly known as: LaMICtal   Take 50 mg by mouth every day.  Dose: 50 mg     omeprazole 40 MG delayed-release capsule  Commonly known as: PriLOSEC   Take 40 mg by mouth every day.   Dose: 40 mg     ondansetron 8 MG Tbdp  Commonly known as: Zofran ODT   Take 8 mg by mouth every 12 hours as needed for Nausea.  Dose: 8 mg     OptiChamber Latesha Misc   1 EACH ONE TIME FOR 1 DOSE.     sulfamethoxazole-trimethoprim 800-160 MG tablet  Commonly known as: Bactrim DS   Take 1 Tablet by mouth 2 times a day.  Dose: 1 Tablet     traZODone 100 MG Tabs  Commonly known as: Desyrel   Take 100 mg by mouth every evening.  Dose: 100 mg     Ubrelvy 100 MG Tabs  Generic drug: Ubrogepant   Doctor's comments: Put on hold  Take 1 tablet by mouth as needed (at onset of migraine.  May repeat dose in 2 hours if unrelieved.  Not to exceed 2 tablets in 24 hours.) for up to 30 days.  Dose: 100 mg     zolpidem 5 MG Tabs  Commonly known as: Ambien   Take 5 mg by mouth at bedtime as needed for Sleep.  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 She had excellent  response in terms of gait, mobility, and strength, balance when working with PT.     She is doing well working with PT as mentioned above. Recommend she continues to follow-up with PT and orthopedic surgeon.    She should continue to follow-up closely with psychiatrist    She is follow-up with genetics at [facility] in April 2025    Follow-up in 3 months      BILLING DOCUMENTATION:     The number of minutes of face-to-face time spent in this encounter was I spent a total of 20 minutes on the day of the visit.  . Over 50% of the time of the visit today was spent on counseling and/or coordination of care wtih the patient and/or family, as outlined above in assessment in plan.    Reed De Los Santos MD  Department of Neurology at [facility]  Diplomate of the American Board of Psychiatry and Neurology, General Neurology  Diplomate of American Board of Psychiatry and Neurology, a Member Board of the American Board of Medical Subspecialties, Epilepsy  Director of [facility] Level III Comprehensive Epile[facility]  Professor of Clinical Neurology, [facility].   [address], [address]  [facility] [id number] [phone]   Fax: [phone]  E-mail: [email or web address]

## 2025-03-19 NOTE — OP THERAPY PROGRESS SUMMARY
Outpatient Physical Therapy  PROGRESS SUMMARY NOTE      [facility]  [address]  [address]  [place] [address]  Phone:  [phone]  Fax:  [phone]    Date of Visit: 03/17/2025    Patient: Eva Washington  YOB: 2005  MRN: [id number]     Referring Provider: Reed De Los Santos M.D.  [address] [place] [address] [address]  [place] [address]   Referring Diagnosis No admission diagnoses are documented for this encounter.     Visit Diagnoses     ICD-10-CM   1. 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 Muscle weakness (generalized)  M62.81   4. Gait difficulty  R26.9       Rehab Potential: good    Progress Report Period: 2/5/25-3/17/25    Functional Assessment Used    6MWT, TUG, 5STS, minibest      Objective Findings and Assessment:   Patient progression towards goals: Pt has only completed 2 follow ups since last PN on 2/5 and not seen since 2/25. 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  Since surgery pt gait has not returned to baseline where she consistently needs hand held assist to ambulate short periods in the clinic due to fall risk and occ tripping requiring max A for recovery due to impaired reactionary balance. This is also supported by assessment with limited change in TUG, 5STS and 6MWT as compared to pre surgery although improved last PN or two. Time spent addressing other barriers that may assist with mobility such as use of AFOs or assistive device but pt currently against any use. With AFOs today only min change in mobilty. However, did have multiple max A when ambualting end of session and during 6MWT wihtout AD. Pt overall reluctant to agree to use of AD or AFO at this time and has been discussed  previously in the POC. She will cont to benefit from skilled PT 2 x 8 weeks to dec fall risk, improve balance, and maximize overall pt function.     Short Term Goals:   Pt will be compliant with HEP progressing  Pt will improve miniBEST by 4 MCID to decr risk for falls cont not met  pt will report pain while walking in boot 5/10 or better consistently.  Pt will demonstrate full toe flexion/ext AROM to improve gait with pain less than 1/10. met  Short term goal time span:  2-4 weeks      Long Term Goals:    Pt will be able to demo improvement 5xSTS <12sec met with and without UE  not met cont  Pt will be able to improvement with 6MWT by 200ft not met cont  Pt will be able to demo improvement on miniBEST >21, low fall risk not met cont  Pt will demonstrate ability to ambulate without boot, when cleared, 150 feet without LOB to dec fall risk cont-no longer in boot but inconsistent performance with walking SPV, HHA, max A  Long term goal time span:  6-8 weeks       Objective findings and assessment details: 5STS no UE 13.26 (1st attempt total a to prevent fall on rep 2.)  TUG 23.22  TUG 17.81     TUG B AFOS 19.51, 2. 20.23 (some improved toe clearance)  6MWT AFOs rests 2-20 standing, seated 445-5:50  2 total A, early in test, 1 mod A, VC with determination of gait mechanics to slow down/stabilize.  354 feet mod RPE 4/10 visible fatigued/out of breath previous 420 pre surgery      Goals:   Short Term Goals:   Pt will be compliant with HEP progressing  Pt will improve miniBEST by 4 MCID to decr risk for falls cont not met  pt will report pain while walking in boot 5/10 or better consistently.  Short term goal time span:  2-4 weeks      Long Term Goals:    Pt will be able to demo improvement 5xSTS <12sec met with and without UE   Pt will be able to improvement with 6MWT by 200ft   Pt will be able to demo improvement on miniBEST >21,  Pt will demonstrate ability to ambulate without boot, when cleared, 150 feet without LOB  to dec fall risk   Long term goal time span:  6-8 weeks    Plan:   Planned therapy interventions:  Gait Training (CPT 14485), Neuromuscular Re-education (CPT 35375), Therapeutic Exercise (CPT 48847) and Therapeutic Activities (CPT 45743)  Frequency:  2x week  Duration in weeks:  8      Referring provider co-signature:  I have reviewed this plan of care and my co-signature certifies the need for services.     Certification Period: 03/17/2025 to 05/14/25    Physician Signature: ________________________________ Date: ______________

## 2025-03-20 LAB
APTT PPP: 30.2 SEC (ref 24.7–36)
INR PPP: 1 (ref 0.87–1.13)
PROTHROMBIN TIME: 13.2 SEC (ref 12–14.6)

## 2025-03-21 NOTE — OP THERAPY DAILY TREATMENT
"  Outpatient Physical Therapy  DAILY TREATMENT     [facility] [address]  [address]  [place] [address]  Phone:  [phone]  Fax:  [phone]    Date: 2025    Patient: vEa Washington  YOB: 2005  MRN: [id number]     Time Calculation                   Chief Complaint: No chief complaint on file.    Visit #: 20    SUBJECTIVE:   Foot pain much better, none in the last week.    Feels closer to pre surgery. But lacks confidence related to her balance. When asked goals wants to be pre \"all this\" meaning gait changes    Is taking her psych meds, new  Has been helpful.    Brings up AFOs but not interested,     OBJECTIVE:  Current objective measures:   B Quads  /: 1 catch with easily moving through full ROM  Ankle AROM/P  R/L  DF: 0/12; 4      PN 1/7  TU.7 CGA  5STS: 15.31    Left foot: good wound healing noted on medial gastroc and lateral ankle. One small open area on dorsum of great toe. Reiterated importance of keeping clean, dry and instructed to discontinue the Vaseline she was putting on due to open sore. Mild redness at dorsum around open area but not warm     Lt Great toe/toes ext trace, toe flexion trace    2/5  TUG 15.35 CGA  5STS 11.21 BUE supp  No UE 12.11    Gait no boot : constnatly reaching for wall, items, leaning into therapist with close SBA. One min A for LOB.  MiniBest Test    Anticipatory  STS: 2  Rise to Toes:1  SLS: Left 1. 4 2. 2 Right. 1. 2 2.  3 score 1  Subscore 4 /6    Reactive Postural Control*unsafe at this  Compensatory Stepping Correction-Forward:  Compensatory Stepping Correction-Backwards:  Compensatory Stepping Correction-Lateral: Left:  Right:  Subscore 0/6    Sensory Orientation:  Stance (feet together) Eyes open, Firm surface:2  Stance (feet together) Eyes closed, Foam Surface:1  Incline -eyes closed:2  Subscore 5 /6    Dynamic Gait  Changes in gait speed:1  Walk with head turns-horizontal:1 sig   Walk with " "pivot turns: 1 > 3 sec  Step over obstacles: 0 unable d/t safety   TUG:  Tug CO (counting backwards by 2s from 100) 2    Subscore 5 /10    14/28    Less than 21 indicates increased risk of falling     3/  5STS no UE 13.26 (1st attempt total a to prevent fall on rep 2.)  TUG 23.22  TUG 17.81    TUG B AFOS 19.51, 2. 20.23 (some improved toe clearance)  6MWT AFOs rests 2-20 standing, seated 445-5:50  2 total A, early in test, 1 mod A, VC with determination of gait mechanics to slow down/stabilize.  354 feet mod RPE 4/10 visible fatigued/out of breath previous 420 pre surgery    MiniBest Test    Anticipatory  STS:2  Rise to Toes:1  SLS: Left 1.1 2. 2 Right. 1. 2 2. 2 score 1  Subscore  4/6    Reactive Postural Control  Compensatory Stepping Correction-Forward:0  Compensatory Stepping Correction-Backwards:0  Compensatory Stepping Correction-Lateral: Left: 0 Right:0  Subscore 0/6    Sensory Orientation:  Stance (feet together) Eyes open, Firm surface:2  Stance (feet together) Eyes closed, Foam Surface:1  Incline -eyes closed:2  Subscore 5 /6    Dynamic Gait  Changes in gait speed:1  Walk with head turns-horizontal:1  Walk with pivot turns:1  Step over obstacles: 0  TUG:  Tug CO    Subscore 4 /10    13/28    Less than 21 indicates increased risk of falling     Therapeutic Exercises (CPT 17425):     1. shuttle squats, 3 x 20, 5 cords    2. shuttle SL squats, 3 x 15 B, 3 cords    5. STS from 18\" table no UE, HEP, sig fatigue    7. SL, NT    9. standing hip abd, deferred d/t calf pain, unable d/t calf pain    10. lateral stepping, deferred d/t calf pain, unable d/t calf pain    11. Prone hip flex st, NT    12. Prone hip extension, NT    13. Prone superwoman, NT    19. \, last PN: 2/      Therapeutic Exercise Summary: Ankle AROM/P  R/L  DF: 0/12; 4/12      Therapeutic Treatments and Modalities:     1. Neuromuscular Re-education (CPT 38701), see below    Therapeutic Treatment and Modalities Summary: NMR:   Minibest " "see objective  TUG see objective  5STS see objective  6MWT see objective    Discussion on POC and progress towards goals. Discussed pt specific goals in which her primary goal is to walk \"normal\" therefore time discussing pt expectations vs current progress and limitations associated with her condition    Discussed ways to improve function/energy conservation such as walking with AFOs even performed trials with TUG and 6MWT see objective. However, pt not really on board. And this discussion/trials have been completed frequently during this POC. Also discussed trial of walking with a trekking pole of single UE AD as pt is still very high fall risk and requires HHA during sessions or max A to prevent falls. Again, she is reluctant      Time-based treatments/modalities:       ASSESSMENT:   Response to treatment:  Pt has only completed 2 follow ups since last PN on 2/5 and not seen since 2/25. 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  Since surgery pt gait has not returned to baseline where she consistently needs hand held assist to ambulate short periods in the clinic due to fall risk and occ tripping requiring max A for recovery due to impaired reactionary balance. This is also supported by assessment with limited change in TUG, 5STS and 6MWT as compared to pre surgery although improved last PN or two. Time spent addressing other barriers that may assist with mobility such as use of AFOs or assistive device but pt currently against any use. With AFOs today only min change in mobilty. However, did have multiple max A when ambualting end of session and during 6MWT wihtout AD. Pt overall reluctant to agree to use of AD or AFO at this time and has been discussed previously in the POC. She will cont to benefit from skilled PT 2 " x 8 weeks to dec fall risk, improve balance, and maximize overall pt function.     Short Term Goals:   Pt will be compliant with HEP progressing  Pt will improve miniBEST by 4 MCID to decr risk for falls cont not met  pt will report pain while walking in boot 5/10 or better consistently.  Pt will demonstrate full toe flexion/ext AROM to improve gait with pain less than 1/10. met  Short term goal time span:  2-4 weeks      Long Term Goals:    Pt will be able to demo improvement 5xSTS <12sec met with and without UE  not met cont  Pt will be able to improvement with 6MWT by 200ft not met cont  Pt will be able to demo improvement on miniBEST >21, low fall risk not met cont  Pt will demonstrate ability to ambulate without boot, when cleared, 150 feet without LOB to dec fall risk cont-no longer in boot but inconsistent performance with walking SPV, HHA, max A  Long term goal time span:  6-8 weeks       PLAN/RECOMMENDATIONS:   Plan for treatment: therapy treatment to continue next visit.  Planned interventions for next visit: continue with current treatment.    Hiking stick/dowel gait. 6MWT no AFOs

## 2025-03-24 ENCOUNTER — APPOINTMENT (OUTPATIENT)
Dept: PHYSICAL THERAPY | Facility: REHABILITATION | Age: 20
End: 2025-03-24
Attending: STUDENT IN AN ORGANIZED HEALTH CARE EDUCATION/TRAINING PROGRAM
Payer: COMMERCIAL

## 2025-03-26 ENCOUNTER — PHYSICAL THERAPY (OUTPATIENT)
Dept: PHYSICAL THERAPY | Facility: REHABILITATION | Age: 20
End: 2025-03-26
Attending: STUDENT IN AN ORGANIZED HEALTH CARE EDUCATION/TRAINING PROGRAM
Payer: COMMERCIAL

## 2025-03-26 DIAGNOSIS — G11.4 AUTOSOMAL DOMINANT SPASTIC PARAPLEGIA (HCC): ICD-10-CM

## 2025-03-26 DIAGNOSIS — R26.9 GAIT DIFFICULTY: ICD-10-CM

## 2025-03-26 DIAGNOSIS — M62.81 MUSCLE WEAKNESS (GENERALIZED): ICD-10-CM

## 2025-03-26 DIAGNOSIS — Z15.89 MONOALLELIC MUTATION OF SPTAN1 GENE: ICD-10-CM

## 2025-03-26 PROCEDURE — 97110 THERAPEUTIC EXERCISES: CPT

## 2025-03-26 NOTE — OP THERAPY DAILY TREATMENT
Outpatient Physical Therapy  DAILY TREATMENT     [facility] [address]  [address]  [place] [address]  Phone:  [phone]  Fax:  [phone]    Date: 2025    Patient: Eva Washington  YOB: 2005  MRN: [id number]     Time Calculation    Start time: 1545  Stop time: 1632 Time Calculation (min): 47 minutes         Chief Complaint: Weakness and Loss Of Balance    Visit #: 20    SUBJECTIVE:   Pt reports feeling a little more tired today, only got 3 hours of sleep, and states living in loud environment increasing headaches. Pt points to headaches above eyebrows and going back to middle of head, states she gets some blurry vision when she feels a migraine coming on. Pt denies any vision changes in clinic and headache being minor.    OBJECTIVE:  Current objective measures:   B Quads  : 1 catch with easily moving through full ROM  Ankle AROM/P  R/L  DF: 0/12;       PN   TU.7 CGA  5STS: 15.31    Left foot: good wound healing noted on medial gastroc and lateral ankle. One small open area on dorsum of great toe. Reiterated importance of keeping clean, dry and instructed to discontinue the Vaseline she was putting on due to open sore. Mild redness at dorsum around open area but not warm     Lt Great toe/toes ext trace, toe flexion trace    2/5  TUG 15.35 CGA  5STS 11.21 BUE supp  No UE 12.11    Gait no boot : constnatly reaching for wall, items, leaning into therapist with close SBA. One min A for LOB.  MiniBest Test    Anticipatory  STS: 2  Rise to Toes:1  SLS: Left 1. 4 2. 2 Right. 1. 2 2.  3 score 1  Subscore 4 /6    Reactive Postural Control*unsafe at this  Compensatory Stepping Correction-Forward:  Compensatory Stepping Correction-Backwards:  Compensatory Stepping Correction-Lateral: Left:  Right:  Subscore 0/6    Sensory Orientation:  Stance (feet together) Eyes open, Firm surface:2  Stance (feet together) Eyes closed, Foam Surface:1  Incline  "-eyes closed:2  Subscore 5 /6    Dynamic Gait  Changes in gait speed:1  Walk with head turns-horizontal:1 sig   Walk with pivot turns: 1 > 3 sec  Step over obstacles: 0 unable d/t safety   TUG:  Tug CO (counting backwards by 2s from 100) 2    Subscore 5 /10    14/28    Less than 21 indicates increased risk of falling     3/17  5STS no UE 13.26 (1st attempt total a to prevent fall on rep 2.)  TUG 23.22  TUG 17.81    TUG B AFOS 19.51, 2. 20.23 (some improved toe clearance)  6MWT AFOs rests 2-20 standing, seated 445-5:50  2 total A, early in test, 1 mod A, VC with determination of gait mechanics to slow down/stabilize.  354 feet mod RPE 4/10 visible fatigued/out of breath previous 420 pre surgery    MiniBest Test    Anticipatory  STS:2  Rise to Toes:1  SLS: Left 1.1 2. 2 Right. 1. 2 2. 2 score 1  Subscore  4/6    Reactive Postural Control  Compensatory Stepping Correction-Forward:0  Compensatory Stepping Correction-Backwards:0  Compensatory Stepping Correction-Lateral: Left: 0 Right:0  Subscore 0/6    Sensory Orientation:  Stance (feet together) Eyes open, Firm surface:2  Stance (feet together) Eyes closed, Foam Surface:1  Incline -eyes closed:2  Subscore 5 /6    Dynamic Gait  Changes in gait speed:1  Walk with head turns-horizontal:1  Walk with pivot turns:1  Step over obstacles: 0  TUG:  Tug CO    Subscore 4 /10    13/28    Less than 21 indicates increased risk of falling     Therapeutic Exercises (CPT 82194):     1. shuttle squats, NT    2. shuttle SL squats, NT    5. STS from 18\" table no UE, NT    7. SL, NT    9. standing hip abd, NT    10. lateral stepping, NT, =    11. Prone hip flex st, NT    12. Prone hip extension, NT    13. Prone superwoman, NT, last PN: 2/5      Therapeutic Exercise Summary:   HHA when walking into gym, uses walls for balance support, advised using hiking stick for improved balance on own, pt verbalized understanding but was uninterested in attempting a walking stick.  Core " "exercises and LE exercises to improve core stability, LE strength with a focus on posterior chain strengthening/stability to assist with walking, balance, and functional activities.  1. Seated sit-ups: min vc for minimizing LE compensation  2. Seated MB rotations x10 reps B w/ 9lb MB    A1: quadriped hip extensions x10 reps B, min vc for keeping knee straight when lifting  A2: quadriped arm lift offs x10 reps w/ 10 sec hold at top, min vc for not shrugging shoulder or sitting back on hips to off-load UE, tactile cue on shoulder blades to pull down and back to reduce scapular winging.    B1: tall kneel hip extensions 2x10 reps, min vc for pushing hips through all the way at top.  B2: sidelying hip abduction x10 reps B, min vc for moving LE in a diagonal angle, pt only able to lift BLE ~2-3\"    C1: supine hip brige x10 reps, min vc to lift hips as high as can, pt able to get to full hip ext  C2: SL hip bridge x10 reps B, no glute clearance but able to activate    D1: hip bridge with BLEs on yoga ball 2x10 reps, min vc to keep B feet pointed towards ceiling.  D2: double leg knee flexion on yoga ball x10 reps, pt reported movement very easy    Pt had LE fatigue following session with sig shaking of LE's when attempting to stand.  - modified sunil: 0's for B knee flexion/extension  - shaking stops when resting in sitting position.      Therapeutic Treatments and Modalities:     Therapeutic Treatment and Modalities Summary:       Time-based treatments/modalities:    Physical Therapy Timed Treatment Charges  Therapeutic exercise minutes (CPT 22249): 47 minutes  ASSESSMENT:   Response to treatment:  Session focus on posterior chain strengthening of the LE's and core exercises for improving core stability and LE strength when walking or walking balance. Pt had fatigue of LE's after session and unable to stand w/out excessive LE shaking, 0's on modified sunil for knee flexion/extension suggesting no spasticity, most " likely due to fatigue. Pt wc'd to car outside clinic due to safety concerns, advised father to SPV when transferring and walking into house, pt verbalized understanding and agreement. Pt conts to be uninterested in hiking stick or AD for balance when walking on own.       PLAN/RECOMMENDATIONS:   Plan for treatment: therapy treatment to continue next visit.  Planned interventions for next visit: continue with current treatment.      X As the licensed therapist supervising this student, I was present during the entire treatment session directing the care and reviewing the assessment plan.  I reviewed all documentation prior to signing.

## 2025-03-28 DIAGNOSIS — G43.019 INTRACTABLE MIGRAINE WITHOUT AURA AND WITHOUT STATUS MIGRAINOSUS: ICD-10-CM

## 2025-03-28 PROCEDURE — RXMED WILLOW AMBULATORY MEDICATION CHARGE: Performed by: NURSE PRACTITIONER

## 2025-03-28 NOTE — TELEPHONE ENCOUNTER
Pharmacy is requesting to please clarify strength, directions, quantity, and number of refills. Thank!    Harmony De Jesus, Med Ass't

## 2025-03-31 ENCOUNTER — PHARMACY VISIT (OUTPATIENT)
Dept: PHARMACY | Facility: MEDICAL CENTER | Age: 20
End: 2025-03-31
Payer: COMMERCIAL

## 2025-03-31 ENCOUNTER — PHYSICAL THERAPY (OUTPATIENT)
Dept: PHYSICAL THERAPY | Facility: REHABILITATION | Age: 20
End: 2025-03-31
Attending: STUDENT IN AN ORGANIZED HEALTH CARE EDUCATION/TRAINING PROGRAM
Payer: COMMERCIAL

## 2025-03-31 DIAGNOSIS — Z15.89 MONOALLELIC MUTATION OF SPTAN1 GENE: ICD-10-CM

## 2025-03-31 DIAGNOSIS — G11.4 AUTOSOMAL DOMINANT SPASTIC PARAPLEGIA (HCC): ICD-10-CM

## 2025-03-31 DIAGNOSIS — M62.81 MUSCLE WEAKNESS (GENERALIZED): ICD-10-CM

## 2025-03-31 DIAGNOSIS — R26.9 GAIT DIFFICULTY: ICD-10-CM

## 2025-03-31 PROCEDURE — 97112 NEUROMUSCULAR REEDUCATION: CPT

## 2025-03-31 NOTE — OP THERAPY DAILY TREATMENT
Outpatient Physical Therapy  DAILY TREATMENT     [facility] [address]  [address]  [place] [address]  Phone:  [phone]  Fax:  [phone]    Date: 2025    Patient: Eva Washington  YOB: 2005  MRN: [id number]     Time Calculation                   Chief Complaint: No chief complaint on file.    Visit #: 21    SUBJECTIVE:   Pt reports feeling a little more tired today, only got 3 hours of sleep, and states living in loud environment increasing headaches. Pt points to headaches above eyebrows and going back to middle of head, states she gets some blurry vision when she feels a migraine coming on. Pt denies any vision changes in clinic and headache being minor.    OBJECTIVE:  Current objective measures:   B Quads  2/: 1 catch with easily moving through full ROM  Ankle AROM/P  R/L  DF: 0/12; 4/12      PN 1/7  TU.7 CGA  5STS: 15.31    Left foot: good wound healing noted on medial gastroc and lateral ankle. One small open area on dorsum of great toe. Reiterated importance of keeping clean, dry and instructed to discontinue the Vaseline she was putting on due to open sore. Mild redness at dorsum around open area but not warm     Lt Great toe/toes ext trace, toe flexion trace    2/5  TUG 15.35 CGA  5STS 11.21 BUE supp  No UE 12.11    Gait no boot : constnatly reaching for wall, items, leaning into therapist with close SBA. One min A for LOB.  MiniBest Test    Anticipatory  STS: 2  Rise to Toes:1  SLS: Left 1. 4 2. 2 Right. 1. 2 2.  3 score 1  Subscore 4 /6    Reactive Postural Control*unsafe at this  Compensatory Stepping Correction-Forward:  Compensatory Stepping Correction-Backwards:  Compensatory Stepping Correction-Lateral: Left:  Right:  Subscore 0/6    Sensory Orientation:  Stance (feet together) Eyes open, Firm surface:2  Stance (feet together) Eyes closed, Foam Surface:1  Incline -eyes closed:2  Subscore 5 /6    Dynamic Gait  Changes in gait  "speed:1  Walk with head turns-horizontal:1 sig   Walk with pivot turns: 1 > 3 sec  Step over obstacles: 0 unable d/t safety   TUG:  Tug CO (counting backwards by 2s from 100) 2    Subscore 5 /10    14/28    Less than 21 indicates increased risk of falling     3/  5STS no UE 13.26 (1st attempt total a to prevent fall on rep 2.)  TUG 23.22  TUG 17.81    TUG B AFOS 19.51, 2. 20.23 (some improved toe clearance)  6MWT AFOs rests 2-20 standing, seated 445-5:50  2 total A, early in test, 1 mod A, VC with determination of gait mechanics to slow down/stabilize.  354 feet mod RPE 4/10 visible fatigued/out of breath previous 420 pre surgery    MiniBest Test    Anticipatory  STS:2  Rise to Toes:1  SLS: Left 1.1 2. 2 Right. 1. 2 2. 2 score 1  Subscore  4/6    Reactive Postural Control  Compensatory Stepping Correction-Forward:0  Compensatory Stepping Correction-Backwards:0  Compensatory Stepping Correction-Lateral: Left: 0 Right:0  Subscore 0/6    Sensory Orientation:  Stance (feet together) Eyes open, Firm surface:2  Stance (feet together) Eyes closed, Foam Surface:1  Incline -eyes closed:2  Subscore 5 /6    Dynamic Gait  Changes in gait speed:1  Walk with head turns-horizontal:1  Walk with pivot turns:1  Step over obstacles: 0  TUG:  Tug CO    Subscore 4 /10    13/28    Less than 21 indicates increased risk of falling     Therapeutic Exercises (CPT 77244):     1. shuttle squats, NT    2. shuttle SL squats, NT    5. STS from 18\" table no UE, NT    7. SL, NT    9. standing hip abd, NT    10. lateral stepping, NT, =    11. Prone hip flex st, NT    12. Prone hip extension, NT    13. Prone superwoman, NT    20. 3/-      Therapeutic Exercise Summary: .      Therapeutic Treatments and Modalities:     Therapeutic Treatment and Modalities Summary:     Time-based treatments/modalities:       ASSESSMENT:   Response to treatment:  Session focus on posterior chain strengthening of the LE's and core exercises for improving " core stability and LE strength when walking or walking balance. Pt had fatigue of LE's after session and unable to stand w/out excessive LE shaking, 0's on modified sunil for knee flexion/extension suggesting no spasticity, most likely due to fatigue. Pt wc'd to car outside clinic due to safety concerns, advised father to SPV when transferring and walking into house, pt verbalized understanding and agreement. Pt conts to be uninterested in hiking stick or AD for balance when walking on own.       PLAN/RECOMMENDATIONS:   Plan for treatment: therapy treatment to continue next visit.  Planned interventions for next visit: continue with current treatment.

## 2025-03-31 NOTE — OP THERAPY DAILY TREATMENT
Outpatient Physical Therapy  DAILY TREATMENT     [facility] [address]  [address]  [place] [address]  Phone:  [phone]  Fax:  [phone]    Date: 2025    Patient: Eva Washington  YOB: 2005  MRN: [id number]     Time Calculation    Start time: 1546  Stop time: 1629 Time Calculation (min): 43 minutes         Chief Complaint: Loss Of Balance and Difficulty Walking    Visit #: 21    SUBJECTIVE:   Pt reports she was sore from last session and had some difficulty getting up stairs but today feels better. Pt's younger sister attended session.    OBJECTIVE:  Current objective measures:   B Quads  /: 1 catch with easily moving through full ROM  Ankle AROM/P  R/L  DF: 0/12; 4      PN 1  TU.7 CGA  5STS: 15.31    Left foot: good wound healing noted on medial gastroc and lateral ankle. One small open area on dorsum of great toe. Reiterated importance of keeping clean, dry and instructed to discontinue the Vaseline she was putting on due to open sore. Mild redness at dorsum around open area but not warm     Lt Great toe/toes ext trace, toe flexion trace    2/5  TUG 15.35 CGA  5STS 11.21 BUE supp  No UE 12.11    Gait no boot : constnatly reaching for wall, items, leaning into therapist with close SBA. One min A for LOB.  MiniBest Test    Anticipatory  STS: 2  Rise to Toes:1  SLS: Left 1. 4 2. 2 Right. 1. 2 2.  3 score 1  Subscore 4 /6    Reactive Postural Control*unsafe at this  Compensatory Stepping Correction-Forward:  Compensatory Stepping Correction-Backwards:  Compensatory Stepping Correction-Lateral: Left:  Right:  Subscore 0/6    Sensory Orientation:  Stance (feet together) Eyes open, Firm surface:2  Stance (feet together) Eyes closed, Foam Surface:1  Incline -eyes closed:2  Subscore 5 /6    Dynamic Gait  Changes in gait speed:1  Walk with head turns-horizontal:1 sig   Walk with pivot turns: 1 > 3 sec  Step over obstacles: 0 unable d/t  "safety   TUG:  Tug CO (counting backwards by 2s from 100) 2    Subscore 5 /10    14/28    Less than 21 indicates increased risk of falling     3/17  5STS no UE 13.26 (1st attempt total a to prevent fall on rep 2.)  TUG 23.22  TUG 17.81    TUG B AFOS 19.51, 2. 20.23 (some improved toe clearance)  6MWT AFOs rests 2-20 standing, seated 445-5:50  2 total A, early in test, 1 mod A, VC with determination of gait mechanics to slow down/stabilize.  354 feet mod RPE 4/10 visible fatigued/out of breath previous 420 pre surgery    MiniBest Test    Anticipatory  STS:2  Rise to Toes:1  SLS: Left 1.1 2. 2 Right. 1. 2 2. 2 score 1  Subscore  4/6    Reactive Postural Control  Compensatory Stepping Correction-Forward:0  Compensatory Stepping Correction-Backwards:0  Compensatory Stepping Correction-Lateral: Left: 0 Right:0  Subscore 0/6    Sensory Orientation:  Stance (feet together) Eyes open, Firm surface:2  Stance (feet together) Eyes closed, Foam Surface:1  Incline -eyes closed:2  Subscore 5 /6    Dynamic Gait  Changes in gait speed:1  Walk with head turns-horizontal:1  Walk with pivot turns:1  Step over obstacles: 0  TUG:  Tug CO    Subscore 4 /10    13/28    Less than 21 indicates increased risk of falling     Therapeutic Exercises (CPT 86481):     1. shuttle squats, NT    2. shuttle SL squats, NT    5. STS from 18\" table no UE, NT    7. SL, NT    9. standing hip abd, NT    10. lateral stepping, NT, =    11. Prone hip flex st, NT    12. Prone hip extension, NT    13. Prone superwoman, NT, last PN: 2/5      Therapeutic Exercise Summary:   HHA when walking into gym, uses walls for balance support, advised using hiking stick for improved balance on own, pt verbalized understanding but was uninterested in attempting a walking stick.  Core exercises and LE exercises to improve core stability, LE strength with a focus on posterior chain strengthening/stability to assist with walking, balance, and functional activities.  1. " "Seated sit-ups: min vc for minimizing LE compensation  2. Seated MB rotations x10 reps B w/ 9lb MB    A1: quadriped hip extensions x10 reps B, min vc for keeping knee straight when lifting  A2: quadriped arm lift offs x10 reps w/ 10 sec hold at top, min vc for not shrugging shoulder or sitting back on hips to off-load UE, tactile cue on shoulder blades to pull down and back to reduce scapular winging.    B1: tall kneel hip extensions 2x10 reps, min vc for pushing hips through all the way at top.  B2: sidelying hip abduction x10 reps B, min vc for moving LE in a diagonal angle, pt only able to lift BLE ~2-3\"    C1: supine hip brige x10 reps, min vc to lift hips as high as can, pt able to get to full hip ext  C2: SL hip bridge x10 reps B, no glute clearance but able to activate    D1: hip bridge with BLEs on yoga ball 2x10 reps, min vc to keep B feet pointed towards ceiling.  D2: double leg knee flexion on yoga ball x10 reps, pt reported movement very easy    Pt had LE fatigue following session with sig shaking of LE's when attempting to stand.  - modified sunil: 0's for B knee flexion/extension  - shaking stops when resting in sitting position.      Therapeutic Treatments and Modalities:     1. Neuromuscular Re-education (CPT 14822)    Therapeutic Treatment and Modalities Summary:   At beginning of session, pt had anterior LOB into mat table when walking from lobby into gym, drug feet and caught toe on floor when PT reiterated to pt to make smart choices with walking and not rushing to table, pt able to catch self with hands on table.  NMR:   - to improve reactive balance stepping utilized clock yourself milena in front of pony bars to use for 1UE support 20spm x5 mins, pt had ~9 errors mostly with 4, 5, and 6 numbers. PT moved floor stickers a little wider, 20spm x5 mins, pt had 5 errors with 3 on number 9 BUE support. 1 instance maxA vc for finding number 9. Pt had improved performance when sister was not near.  - " fwd half foam roller step overs 2x10 steps B, progressed from BUE to 1UE support.  - lateral half foam roller step overs 2x10 steps B, progressed from BUE to 1UE support.  Pt able to clear LE's when having UE support. Cont to reiterate to pt that she may benefit from a walking stick or cane for UE support and to avoid LOB as she had no foot drag for balance activities when using UE support.    Time-based treatments/modalities:    Physical Therapy Timed Treatment Charges  Neuromusc re-ed, balance, coor, post minutes (CPT 71177): 43 minutes  ASSESSMENT:   Response to treatment:  Session focus on dynamic balance progressing step-overs to 1UE support and reactive balance with the [employer] yourself milena. Pt able to clear LE's when using UE support, cont to reiterate to pt that she may benefit from walking stick or other AD for improved balance as pt conts to trip walking into clinic w/out UE support. Pt had improved performances with i[employer] milena when sister was not near area of performance, but overall had some difficulty with identifying clock numbers posterolateral to the R>L.       PLAN/RECOMMENDATIONS:   Plan for treatment: therapy treatment to continue next visit.  Planned interventions for next visit: continue with current treatment.      X As the licensed therapist supervising this student, I was present during the entire treatment session directing the care and reviewing the assessment plan.  I reviewed all documentation prior to signing.

## 2025-04-02 RX ORDER — UBROGEPANT 100 MG/1
100 TABLET ORAL PRN
Qty: 10 TABLET | Refills: 5 | Status: SHIPPED | OUTPATIENT
Start: 2025-04-02 | End: 2025-05-25

## 2025-04-07 ENCOUNTER — APPOINTMENT (OUTPATIENT)
Dept: PHYSICAL THERAPY | Facility: REHABILITATION | Age: 20
End: 2025-04-07
Attending: STUDENT IN AN ORGANIZED HEALTH CARE EDUCATION/TRAINING PROGRAM
Payer: COMMERCIAL

## 2025-04-11 ENCOUNTER — APPOINTMENT (OUTPATIENT)
Dept: MEDICAL GROUP | Facility: CLINIC | Age: 20
End: 2025-04-11
Payer: COMMERCIAL

## 2025-04-14 ENCOUNTER — APPOINTMENT (OUTPATIENT)
Dept: PHYSICAL THERAPY | Facility: REHABILITATION | Age: 20
End: 2025-04-14
Attending: STUDENT IN AN ORGANIZED HEALTH CARE EDUCATION/TRAINING PROGRAM
Payer: COMMERCIAL

## 2025-04-17 ENCOUNTER — PHYSICAL THERAPY (OUTPATIENT)
Dept: PHYSICAL THERAPY | Facility: REHABILITATION | Age: 20
End: 2025-04-17
Attending: STUDENT IN AN ORGANIZED HEALTH CARE EDUCATION/TRAINING PROGRAM
Payer: COMMERCIAL

## 2025-04-17 DIAGNOSIS — Z15.89 MONOALLELIC MUTATION OF SPTAN1 GENE: ICD-10-CM

## 2025-04-17 DIAGNOSIS — M62.81 MUSCLE WEAKNESS (GENERALIZED): ICD-10-CM

## 2025-04-17 DIAGNOSIS — G11.4 AUTOSOMAL DOMINANT SPASTIC PARAPLEGIA (HCC): ICD-10-CM

## 2025-04-17 DIAGNOSIS — R26.9 GAIT DIFFICULTY: ICD-10-CM

## 2025-04-17 PROCEDURE — 97110 THERAPEUTIC EXERCISES: CPT

## 2025-04-17 PROCEDURE — 97112 NEUROMUSCULAR REEDUCATION: CPT

## 2025-04-17 NOTE — OP THERAPY PROGRESS SUMMARY
Outpatient Physical Therapy  PROGRESS SUMMARY NOTE      [facility]  [address]  [address]  [facility] [address]  Phone:  [phone]  Fax:  [phone]    Date of Visit: 04/17/2025    Patient: Eva Washington  YOB: 2005  MRN: [id number]     Referring Provider: Reed De Los Santos M.D.  [address] [place] [address] [address]  [place] [address]   Referring Diagnosis No admission diagnoses are documented for this encounter.     Visit Diagnoses     ICD-10-CM   1. 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 Muscle weakness (generalized)  M62.81   4. Gait difficulty  R26.9       Rehab Potential: fair    Progress Report Period: 3/17/25 - 4/17/25    Functional Assessment Used    MCCANN, 5STS, TUG, 6MWT      Objective Findings and Assessment:   Patient progression towards goals: Pt has completed 2 visits since last PN demonstrating improvement with all goals in LE strength, walking endurance, and functional mobility. Pt improved 5STS from 13.26 to 11.63 secs showing improved functional LE strength. Pt only improved 6MWT by 22' from last PN but improved from needing 2 total assist for LOB to CGA today, only 1 instance minor LOB but pt able to correct by reaching and touching wall.  and agreement. Pt will continue to benefit from skilled PT 1x8 weeks to address current functional impairments.      Time in session also spent addressing pt bringing up suicidal thoughts and that being the reason for cancelling last week's PT appointment, pt denied any intention to harm self or others or having a plan, encouraged pt to cont discussing with mental health provider regarding ways to decrease life stressors, pt verbalized understanding. This may impact the POC.      Objective findings and assessment details: 5STS no UE: 20.81 first trial, fall into chair on 4th rep, 20.49 secs, 11.63 secs 3rd trial  TUG no AFO's: 20.53 secs  6MWT: 376'  CGA, pt touched wall once, standing rest breaks from 0:30-0:45, 2:00-2:15, 3:30-3:50  MCCANN/56        Goals:   Short Term Goals:   Pt will be compliant with HEP to improve LE strength and core stability needed for walking without LOB. Progressing  Pt will improve miniBEST by 4 MCID to decr risk for falls. DC to more appropriate balance test  pt will report pain while walking in boot 5/10 or better consistently. Pt not in boot anymore, reports 2-3/10 pain in R foot consistently, 9.5/10 pain at max  NEW: Pt will report 7/10 R foot pain at worst while walking showing improved walking tolerance.  Short term goal time span:  2-4 weeks      Long Term Goals:    Pt will be able to demo improvement 5xSTS <12sec met with and without UE. MET (11.63 secs)  Pt will be able to improvement with 6MWT by 200ft demonstrating improved walking tolerance. Modified to more appropriate distance (only 22' improvement but CGA and only 1 LOB self-correct against wall)  Pt will be able to demo improvement on miniBEST >21, DC to more appropriate balance test.  Pt will demonstrate ability to ambulate without boot, when cleared, 150 feet without LOB to dec fall risk. MET   NEW: Pt will score 41/56 on MCCANN or better demonstrating low fall risk with functional balance activities.  NEW: Pt will demonstrate ability to ambulate 200' without LOB and no standing rest break to decrease fall risk.  NEW: Pt will improve 6MWT by 75' demonstrating improved walking tolerance.    Goals:   Short Term Goals:   1. Pt will be compliant with HEP to improve LE strength and core stability needed for walking without LOB. Progressing  2. Pt will report 7/10 R foot pain at worst while walking showing improved walking tolerance.  3. Pt will demonstrate improved functional mobility with TUG score 17 sec or better without LOB.  Short term goal time span:  2-4 weeks      Long Term Goals:    1. Pt will score 41/56 on MCCANN or better demonstrating low fall risk with  functional balance activities.  2. Pt will demonstrate ability to ambulate 200' without LOB and no standing rest break to decrease fall risk.  3. Pt will improve 6MWT by 75' demonstrating improved walking tolerance without physical assistance for LOB.  Long term goal time span:  6-8 weeks    Plan:   Planned therapy interventions:  Neuromuscular Re-education (CPT 77260), Therapeutic Activities (CPT 29895), Therapeutic Exercise (CPT 70068) and Gait Training (CPT 47853)  Frequency:  1x week  Duration in weeks:  8      Referring provider co-signature:  I have reviewed this plan of care and my co-signature certifies the need for services.     Certification Period: 04/17/2025 to 06/13/25    Physician Signature: ________________________________ Date: ______________       [x] As the licensed therapist supervising this student, I was present during the entire treatment session directing the care and reviewing the assessment plan.  I reviewed all documentation prior to signing.

## 2025-04-17 NOTE — OP THERAPY DAILY TREATMENT
Outpatient Physical Therapy  DAILY TREATMENT     [facility] [address]  [address]  [place] [address]  Phone:  [phone]  Fax:  [phone]    Date: 2025    Patient: Eva Washington  YOB: 2005  MRN: [id number]     Time Calculation    Start time: 1500  Stop time: 1541 Time Calculation (min): 41 minutes         Chief Complaint: Difficulty Walking, Loss Of Balance, and Weakness    Visit #: 22    SUBJECTIVE:   Pt reports having to miss PT last week due to having thoughts of suicide and thoughts of attempts. Pt denies having any plans to hurt herself or others or any active intentions. Reports seeing her mental health therapist Tuesday when experiencing these feelings. Feels overwhelmed with parents and caring for siblings.  When walking a lot foot pain 9/10, often 5/10, average 2-3/10    OBJECTIVE:  Current objective measures:   B Quads  /: 1 catch with easily moving through full ROM  Ankle AROM/P  R/L  DF: 0/12; 412      PN 1/  TU.7 CGA  5STS: 15.31    Left foot: good wound healing noted on medial gastroc and lateral ankle. One small open area on dorsum of great toe. Reiterated importance of keeping clean, dry and instructed to discontinue the Vaseline she was putting on due to open sore. Mild redness at dorsum around open area but not warm     Lt Great toe/toes ext trace, toe flexion trace    2/5  TUG 15.35 CGA  5STS 11.21 BUE supp  No UE 12.11    Gait no boot : constnatly reaching for wall, items, leaning into therapist with close SBA. One min A for LOB.  MiniBest Test    Anticipatory  STS: 2  Rise to Toes:1  SLS: Left 1. 4 2. 2 Right. 1. 2 2.  3 score 1  Subscore 4 /6    Reactive Postural Control*unsafe at this  Compensatory Stepping Correction-Forward:  Compensatory Stepping Correction-Backwards:  Compensatory Stepping Correction-Lateral: Left:  Right:  Subscore 0/6    Sensory Orientation:  Stance (feet together) Eyes open, Firm  surface:2  Stance (feet together) Eyes closed, Foam Surface:1  Incline -eyes closed:2  Subscore 5 /6    Dynamic Gait  Changes in gait speed:1  Walk with head turns-horizontal:1 sig   Walk with pivot turns: 1 > 3 sec  Step over obstacles: 0 unable d/t safety   TUG:  Tug CO (counting backwards by 2s from 100) 2    Subscore 5 /10    14/28    Less than 21 indicates increased risk of falling     3/17  5STS no UE 13.26 (1st attempt total a to prevent fall on rep 2.)  TUG 23.22  TUG 17.81    TUG B AFOS 19.51, 2. 20.23 (some improved toe clearance)  6MWT AFOs rests 2-20 standing, seated 445-5:50  2 total A, early in test, 1 mod A, VC with determination of gait mechanics to slow down/stabilize.  354 feet mod RPE 4/10 visible fatigued/out of breath previous 420 pre surgery    MiniBest Test    Anticipatory  STS:2  Rise to Toes:1  SLS: Left 1.1 2. 2 Right. 1. 2 2. 2 score 1  Subscore  4/6    Reactive Postural Control  Compensatory Stepping Correction-Forward:0  Compensatory Stepping Correction-Backwards:0  Compensatory Stepping Correction-Lateral: Left: 0 Right:0  Subscore 0/6    Sensory Orientation:  Stance (feet together) Eyes open, Firm surface:2  Stance (feet together) Eyes closed, Foam Surface:1  Incline -eyes closed:2  Subscore 5 /6    Dynamic Gait  Changes in gait speed:1  Walk with head turns-horizontal:1  Walk with pivot turns:1  Step over obstacles: 0  TUG:  Tug CO    Subscore 4 /10    13/28    Less than 21 indicates increased risk of falling     4/17  5STS no UE: 20.81 first trial, fall into chair on 4th rep, 20.49 secs, 11.63 secs 3rd trial  TUG no AFO's: 20.53 secs  6MWT: 376' CGA, pt touched wall once, standing rest breaks from 0:30-0:45, 2:00-2:15, 3:30-3:50    MCCANN  1. Sit to stand  4. Able to stand without using hands and stabilize independently   3. Able to stand independently using hands  2. Able to stand using hands after several tries  1. Needs min A to stand or stabilize  0. Needs mod or max A to  stand    2. Stand unsupported  4. Able to stand safely for 2 min  3. Able to stand 2 min with spv  2. Able to stand 30 sec unsupported  1. Needs several tries to stand unsupported  0. Unable to stand 30 sec unsupported    3. Sitting with back unsupported  4. Able to sit safely and securely for 2 min  3. Able to sit 2 min SPV  2. Able to sit 30 sec  1. Able to sit 10 sec  0. Unable to sit without support x 10 sec    4. Stand to sit  4. Sits safely min use of hands  3. Controls descent by using hands  2. Uses back of legs against chair to control descent  1. Sits independently but uncontrolled descent  0. Needs assist to sit      5. Transfers  4.Able to transfer safely minor use of hands  3.Able to transfer safely definite use of hands  2. Able to transfer with verbal cueing or spv  1. Needs 1 person to assist  0. Needs two people to assist or spv for safety    6. Stand unsupported EC  4. Able to stand 10 sec safely  3. Able to stand 10 sec with spv  2. Able to stand 3 sec  1. Unable to keep EC 3 sec but stays safely  0. Needs help to keep from falling    7. Standing unsupported narrow JESUS  4. Able to place feet independently and stand 1 min safely  3.Able to place feet independently and stand 1 min with SPV  2. Able to place feet together independently but unable to hold 30 sec  1. Needs help to attain position but able to stand 15 seconds feet together  0. Needs help to attain position but unable to stand 15 seconds feet together    8. Reaching forward outstretched arm  4. Can reach forward confidently 25 cm (10 in)  3. Can reach forward 12cm (5 in)  2. Can reach forward 5 cm ( 2 in)  1. Reaches forward but needs spv  0. Loses balance jyoti trying/requires external support    9.  object from floor in standing  4. Able to  slipper safely and easily  3. Able to  slipper but needs spv  2. Unable to  but reaches 2-5cm (1-2in) from slipper and keeps balance independently   1. Unable to   "and needs spv to try  0. Unable to try, needs assist to avoid fall or loss of balance    10. Turn to look behind over left and right shoulders  4. Looks behind from both sides and weight shifts well  3. Looks behind one side only other side shows less weight shift (less weight shift on L)  2. Turns sideways only but maintains balance  1. Needs SPV while turning  0. Needs assistance when turning    11. Turn 360 degrees  4. Able to turn 360 safely in 4 sec or less  3. Able to turn 360 one side only safely in 4 sec or less  2. Able to turn 360 safely but slowly  1. Needs close SPV or cueing (10 secs to turn B)  0. Needs assistance while turning      12. Place alternating LE on stool while standing unsupported  4. Able to stand independently and safely and complete 8 steps in 20 sec  3. Able to stand independently and safely and complete 8 steps in > 20 sec  2. Able to complete 4 steps without aid with SPV  1. Able to complete >2 steps min A  0. Needs assistance to keep from falling unable to try. (LE support from chair when attempting 8\" step)    13. Standing Tandem unsupported  4. Able to place foot independently and hold for 30 sec  3. Able to place foot ahead independently and hold for 30 sec  2. Able to take small step independently and hold 30 sec  1. Needs help to step but can hold 15 sec  0. Loses balance while stepping or standing    14. Single limb  4. Able to lift leg independently and hold > 10 sec  3.Able to lift leg independently and hold  5-10 sec  2. Able to lift leg independently and hold >/equal to 3 sec  1. Tries to lift leg unable to hold 3 sec but remains standing independently   0. Unable to try or needs assist to prevent fall (posterior LOB into chair both sides when attempting)    36/56      41-56 low fall risk, 21-40 medium fall risk, 0-20 high fall risk       Therapeutic Exercises (CPT 90113):     1. 5STS, 6MWT, last PN: 4/17      Therapeutic Exercise Summary:   Educated on objective findings " with 5STS and 6MWT and discussing POC and goals. Time spent addressing pt bringing up thoughts of suicide as reason for cancelling PT last week. Pt denies any plan and danger to self or others. States she is seeing mental health provider. Encouraged pt to cont discussing with mental health provider and problem-solve ways to decrease life stressors at home.      Therapeutic Treatments and Modalities:     1. Neuromuscular Re-education (CPT 04148), MCCANN, TUG    Therapeutic Treatment and Modalities Summary:   NMR: Pt performed MCCANN and TUG and educated on objective findings.  Goal reassessment, POC    Time-based treatments/modalities:    Physical Therapy Timed Treatment Charges  Neuromusc re-ed, balance, coor, post minutes (CPT 42123): 25 minutes  Therapeutic exercise minutes (CPT 75313): 16 minutes  ASSESSMENT:   Response to treatment:  Pt has completed 2 visits since last PN demonstrating improvement with all goals in LE strength, walking endurance, and functional mobility. Pt improved 5STS from 13.26 to 11.63 secs showing improved functional LE strength. Pt only improved 6MWT by 22' from last PN but improved from needing 2 total assist for LOB to CGA today, only 1 instance minor LOB but pt able to correct by reaching and touching wall.  and agreement. Pt will continue to benefit from skilled PT 1x8 weeks to address current functional impairments.      Time in session also spent addressing pt bringing up suicidal thoughts and that being the reason for cancelling last week's PT appointment, pt denied any intention to harm self or others or having a plan, encouraged pt to cont discussing with mental health provider regarding ways to decrease life stressors, pt verbalized understanding. This may impact the POC.    Goals:   Short Term Goals:   Pt will be compliant with HEP to improve LE strength and core stability needed for walking without LOB. Progressing  Pt will improve miniBEST by 4 MCID to decr risk for falls. DC to  more appropriate balance test  pt will report pain while walking in boot 5/10 or better consistently. Pt not in boot anymore, reports 2-3/10 pain in R foot consistently, 9.5/10 pain at max  NEW: Pt will report 7/10 R foot pain at worst while walking showing improved walking tolerance.  Short term goal time span:  2-4 weeks      Long Term Goals:    Pt will be able to demo improvement 5xSTS <12sec met with and without UE. MET (11.63 secs)  Pt will be able to improvement with 6MWT by 200ft demonstrating improved walking tolerance. Modified to more appropriate distance (only 22' improvement but CGA and only 1 LOB self-correct against wall)  Pt will be able to demo improvement on miniBEST >21, DC to more appropriate balance test.  Pt will demonstrate ability to ambulate without boot, when cleared, 150 feet without LOB to dec fall risk. MET   NEW: Pt will score 41/56 on MCCANN or better demonstrating low fall risk with functional balance activities.  NEW: Pt will demonstrate ability to ambulate 200' without LOB and no standing rest break to decrease fall risk.  NEW: Pt will improve 6MWT by 75' demonstrating improved walking tolerance.  Long term goal time span:  6-8 weeks    PLAN/RECOMMENDATIONS:   Plan for treatment: therapy treatment to continue next visit.  Planned interventions for next visit: continue with current treatment.      X As the licensed therapist supervising this student, I was present during the entire treatment session directing the care and reviewing the assessment plan.  I reviewed all documentation prior to signing.

## 2025-04-21 ENCOUNTER — APPOINTMENT (OUTPATIENT)
Dept: PHYSICAL THERAPY | Facility: REHABILITATION | Age: 20
End: 2025-04-21
Attending: STUDENT IN AN ORGANIZED HEALTH CARE EDUCATION/TRAINING PROGRAM
Payer: COMMERCIAL

## 2025-04-21 NOTE — OP THERAPY DAILY TREATMENT
Outpatient Physical Therapy  DAILY TREATMENT     [facility] [address]  [address]  [place] [address]  Phone:  [phone]  Fax:  [phone]    Date: 2025    Patient: Eva Washington  YOB: 2005  MRN: [id number]     Time Calculation                   Chief Complaint: No chief complaint on file.    Visit #: 23    SUBJECTIVE:   Pt reports having to miss PT last week due to having thoughts of suicide and thoughts of attempts. Pt denies having any plans to hurt herself or others or any active intentions. Reports seeing her mental health therapist Tuesday when experiencing these feelings. Feels overwhelmed with parents and caring for siblings.  When walking a lot foot pain 9/10, often 5/10, average 2-3/10    OBJECTIVE:  Current objective measures:   B Quads  2/: 1 catch with easily moving through full ROM  Ankle AROM/P  R/L  DF: 0/12; 4/12      PN 1/  TU.7 CGA  5STS: 15.31    Left foot: good wound healing noted on medial gastroc and lateral ankle. One small open area on dorsum of great toe. Reiterated importance of keeping clean, dry and instructed to discontinue the Vaseline she was putting on due to open sore. Mild redness at dorsum around open area but not warm     Lt Great toe/toes ext trace, toe flexion trace    2/5  TUG 15.35 CGA  5STS 11.21 BUE supp  No UE 12.11    Gait no boot : constnatly reaching for wall, items, leaning into therapist with close SBA. One min A for LOB.  MiniBest Test    Anticipatory  STS: 2  Rise to Toes:1  SLS: Left 1. 4 2. 2 Right. 1. 2 2.  3 score 1  Subscore 4 /6    Reactive Postural Control*unsafe at this  Compensatory Stepping Correction-Forward:  Compensatory Stepping Correction-Backwards:  Compensatory Stepping Correction-Lateral: Left:  Right:  Subscore 0/6    Sensory Orientation:  Stance (feet together) Eyes open, Firm surface:2  Stance (feet together) Eyes closed, Foam Surface:1  Incline -eyes  closed:2  Subscore 5 /6    Dynamic Gait  Changes in gait speed:1  Walk with head turns-horizontal:1 sig   Walk with pivot turns: 1 > 3 sec  Step over obstacles: 0 unable d/t safety   TUG:  Tug CO (counting backwards by 2s from 100) 2    Subscore 5 /10    14/28    Less than 21 indicates increased risk of falling     3/17  5STS no UE 13.26 (1st attempt total a to prevent fall on rep 2.)  TUG 23.22  TUG 17.81    TUG B AFOS 19.51, 2. 20.23 (some improved toe clearance)  6MWT AFOs rests 2-20 standing, seated 445-5:50  2 total A, early in test, 1 mod A, VC with determination of gait mechanics to slow down/stabilize.  354 feet mod RPE 4/10 visible fatigued/out of breath previous 420 pre surgery    MiniBest Test    Anticipatory  STS:2  Rise to Toes:1  SLS: Left 1.1 2. 2 Right. 1. 2 2. 2 score 1  Subscore  4/6    Reactive Postural Control  Compensatory Stepping Correction-Forward:0  Compensatory Stepping Correction-Backwards:0  Compensatory Stepping Correction-Lateral: Left: 0 Right:0  Subscore 0/6    Sensory Orientation:  Stance (feet together) Eyes open, Firm surface:2  Stance (feet together) Eyes closed, Foam Surface:1  Incline -eyes closed:2  Subscore 5 /6    Dynamic Gait  Changes in gait speed:1  Walk with head turns-horizontal:1  Walk with pivot turns:1  Step over obstacles: 0  TUG:  Tug CO    Subscore 4 /10    13/28    Less than 21 indicates increased risk of falling     4/17  5STS no UE: 20.81 first trial, fall into chair on 4th rep, 20.49 secs, 11.63 secs 3rd trial  TUG no AFO's: 20.53 secs  6MWT: 376' CGA, pt touched wall once, standing rest breaks from 0:30-0:45, 2:00-2:15, 3:30-3:50    MCCANN  1. Sit to stand  4. Able to stand without using hands and stabilize independently   3. Able to stand independently using hands  2. Able to stand using hands after several tries  1. Needs min A to stand or stabilize  0. Needs mod or max A to stand    2. Stand unsupported  4. Able to stand safely for 2 min  3. Able to  stand 2 min with spv  2. Able to stand 30 sec unsupported  1. Needs several tries to stand unsupported  0. Unable to stand 30 sec unsupported    3. Sitting with back unsupported  4. Able to sit safely and securely for 2 min  3. Able to sit 2 min SPV  2. Able to sit 30 sec  1. Able to sit 10 sec  0. Unable to sit without support x 10 sec    4. Stand to sit  4. Sits safely min use of hands  3. Controls descent by using hands  2. Uses back of legs against chair to control descent  1. Sits independently but uncontrolled descent  0. Needs assist to sit      5. Transfers  4.Able to transfer safely minor use of hands  3.Able to transfer safely definite use of hands  2. Able to transfer with verbal cueing or spv  1. Needs 1 person to assist  0. Needs two people to assist or spv for safety    6. Stand unsupported EC  4. Able to stand 10 sec safely  3. Able to stand 10 sec with spv  2. Able to stand 3 sec  1. Unable to keep EC 3 sec but stays safely  0. Needs help to keep from falling    7. Standing unsupported narrow JESUS  4. Able to place feet independently and stand 1 min safely  3.Able to place feet independently and stand 1 min with SPV  2. Able to place feet together independently but unable to hold 30 sec  1. Needs help to attain position but able to stand 15 seconds feet together  0. Needs help to attain position but unable to stand 15 seconds feet together    8. Reaching forward outstretched arm  4. Can reach forward confidently 25 cm (10 in)  3. Can reach forward 12cm (5 in)  2. Can reach forward 5 cm ( 2 in)  1. Reaches forward but needs spv  0. Loses balance jyoti trying/requires external support    9.  object from floor in standing  4. Able to  slipper safely and easily  3. Able to  slipper but needs spv  2. Unable to  but reaches 2-5cm (1-2in) from slipper and keeps balance independently   1. Unable to  and needs spv to try  0. Unable to try, needs assist to avoid fall or loss of  "balance    10. Turn to look behind over left and right shoulders  4. Looks behind from both sides and weight shifts well  3. Looks behind one side only other side shows less weight shift (less weight shift on L)  2. Turns sideways only but maintains balance  1. Needs SPV while turning  0. Needs assistance when turning    11. Turn 360 degrees  4. Able to turn 360 safely in 4 sec or less  3. Able to turn 360 one side only safely in 4 sec or less  2. Able to turn 360 safely but slowly  1. Needs close SPV or cueing (10 secs to turn B)  0. Needs assistance while turning      12. Place alternating LE on stool while standing unsupported  4. Able to stand independently and safely and complete 8 steps in 20 sec  3. Able to stand independently and safely and complete 8 steps in > 20 sec  2. Able to complete 4 steps without aid with SPV  1. Able to complete >2 steps min A  0. Needs assistance to keep from falling unable to try. (LE support from chair when attempting 8\" step)    13. Standing Tandem unsupported  4. Able to place foot independently and hold for 30 sec  3. Able to place foot ahead independently and hold for 30 sec  2. Able to take small step independently and hold 30 sec  1. Needs help to step but can hold 15 sec  0. Loses balance while stepping or standing    14. Single limb  4. Able to lift leg independently and hold > 10 sec  3.Able to lift leg independently and hold  5-10 sec  2. Able to lift leg independently and hold >/equal to 3 sec  1. Tries to lift leg unable to hold 3 sec but remains standing independently   0. Unable to try or needs assist to prevent fall (posterior LOB into chair both sides when attempting)    36/56      41-56 low fall risk, 21-40 medium fall risk, 0-20 high fall risk       Therapeutic Exercises (CPT 73730):     1. nustep    2. shuttle    3. lateral stepping    20. 4/17-6/13, last PN: 4/17    Therapeutic Treatments and Modalities:     1. Neuromuscular Re-education (CPT 55492)    " Therapeutic Treatment and Modalities Summary: For improving dynamic balance and stabilty pt ambulated with sports cord for resistance, change in direction, and perturbations. Pt ambulated side stepping bilaterally, forward with line of pull directed posterior, and backwards walking with line of pull anterior.      Time-based treatments/modalities:       ASSESSMENT:   Response to treatment:      PLAN/RECOMMENDATIONS:   Plan for treatment: therapy treatment to continue next visit.  Planned interventions for next visit: continue with current treatment.

## 2025-04-24 PROCEDURE — RXMED WILLOW AMBULATORY MEDICATION CHARGE: Performed by: STUDENT IN AN ORGANIZED HEALTH CARE EDUCATION/TRAINING PROGRAM

## 2025-04-25 ENCOUNTER — PHARMACY VISIT (OUTPATIENT)
Dept: PHARMACY | Facility: MEDICAL CENTER | Age: 20
End: 2025-04-25
Payer: COMMERCIAL

## 2025-04-29 ENCOUNTER — PHYSICAL THERAPY (OUTPATIENT)
Dept: PHYSICAL THERAPY | Facility: REHABILITATION | Age: 20
End: 2025-04-29
Attending: STUDENT IN AN ORGANIZED HEALTH CARE EDUCATION/TRAINING PROGRAM
Payer: COMMERCIAL

## 2025-04-29 DIAGNOSIS — G11.4 SPASTIC PARAPLEGIA, HEREDITARY (HCC): ICD-10-CM

## 2025-04-29 DIAGNOSIS — R29.898 LEG WEAKNESS, BILATERAL: ICD-10-CM

## 2025-04-29 DIAGNOSIS — M62.81 MUSCLE WEAKNESS (GENERALIZED): ICD-10-CM

## 2025-04-29 DIAGNOSIS — G11.9 PRIMARY CEREBELLAR DEGENERATION (HCC): ICD-10-CM

## 2025-04-29 DIAGNOSIS — G11.4 AUTOSOMAL DOMINANT SPASTIC PARAPLEGIA (HCC): ICD-10-CM

## 2025-04-29 DIAGNOSIS — Z91.81 FALLS INFREQUENTLY: ICD-10-CM

## 2025-04-29 DIAGNOSIS — G11.9 CEREBELLAR ATAXIA (HCC): ICD-10-CM

## 2025-04-29 DIAGNOSIS — R26.9 GAIT DIFFICULTY: ICD-10-CM

## 2025-04-29 DIAGNOSIS — Z15.89 MONOALLELIC MUTATION OF SPTAN1 GENE: ICD-10-CM

## 2025-04-29 DIAGNOSIS — Z74.09 IMPAIRED FUNCTIONAL MOBILITY, BALANCE, GAIT, AND ENDURANCE: ICD-10-CM

## 2025-04-29 DIAGNOSIS — M20.10 VALGUS DEFORMITY OF GREAT TOE, UNSPECIFIED LATERALITY: ICD-10-CM

## 2025-04-29 PROCEDURE — 97530 THERAPEUTIC ACTIVITIES: CPT

## 2025-04-29 PROCEDURE — 97112 NEUROMUSCULAR REEDUCATION: CPT

## 2025-04-29 NOTE — OP THERAPY DAILY TREATMENT
Outpatient Physical Therapy  DAILY TREATMENT     [facility] [address].  [address]  [place] [address]  Phone:  [phone]  Fax:  [phone]    Date: 04/29/2025    Patient: Eva Washington  YOB: 2005  MRN: [id number]     Time Calculation    Start time: 1531  Stop time: 1612 Time Calculation (min): 41 minutes         Chief Complaint: Difficulty Walking, Loss Of Balance, and Weakness    Visit #: 23    SUBJECTIVE:   Pt reports having to miss PT last week due to having thoughts of suicide and thoughts of attempts. Pt denies having any plans to hurt herself or others or any active intentions. Reports seeing her mental health therapist Tuesday when experiencing these feelings. Feels overwhelmed with parents and caring for siblings.  When walking a lot foot pain 9/10, often 5/10, average 2-3/10    OBJECTIVE:  Current objective measures:   4/17  5STS no UE: 20.81 first trial, fall into chair on 4th rep, 20.49 secs, 11.63 secs 3rd trial  TUG no AFO's: 20.53 secs  6MWT: 376' CGA, pt touched wall once, standing rest breaks from 0:30-0:45, 2:00-2:15, 3:30-3:50    MCCANN  1. Sit to stand  4. Able to stand without using hands and stabilize independently   3. Able to stand independently using hands  2. Able to stand using hands after several tries  1. Needs min A to stand or stabilize  0. Needs mod or max A to stand    2. Stand unsupported  4. Able to stand safely for 2 min  3. Able to stand 2 min with spv  2. Able to stand 30 sec unsupported  1. Needs several tries to stand unsupported  0. Unable to stand 30 sec unsupported    3. Sitting with back unsupported  4. Able to sit safely and securely for 2 min  3. Able to sit 2 min SPV  2. Able to sit 30 sec  1. Able to sit 10 sec  0. Unable to sit without support x 10 sec    4. Stand to sit  4. Sits safely min use of hands  3. Controls descent by using hands  2. Uses back of legs against chair to control descent  1. Sits  independently but uncontrolled descent  0. Needs assist to sit      5. Transfers  4.Able to transfer safely minor use of hands  3.Able to transfer safely definite use of hands  2. Able to transfer with verbal cueing or spv  1. Needs 1 person to assist  0. Needs two people to assist or spv for safety    6. Stand unsupported EC  4. Able to stand 10 sec safely  3. Able to stand 10 sec with spv  2. Able to stand 3 sec  1. Unable to keep EC 3 sec but stays safely  0. Needs help to keep from falling    7. Standing unsupported narrow JESUS  4. Able to place feet independently and stand 1 min safely  3.Able to place feet independently and stand 1 min with SPV  2. Able to place feet together independently but unable to hold 30 sec  1. Needs help to attain position but able to stand 15 seconds feet together  0. Needs help to attain position but unable to stand 15 seconds feet together    8. Reaching forward outstretched arm  4. Can reach forward confidently 25 cm (10 in)  3. Can reach forward 12cm (5 in)  2. Can reach forward 5 cm ( 2 in)  1. Reaches forward but needs spv  0. Loses balance jyoti trying/requires external support    9.  object from floor in standing  4. Able to  slipper safely and easily  3. Able to  slipper but needs spv  2. Unable to  but reaches 2-5cm (1-2in) from slipper and keeps balance independently   1. Unable to  and needs spv to try  0. Unable to try, needs assist to avoid fall or loss of balance    10. Turn to look behind over left and right shoulders  4. Looks behind from both sides and weight shifts well  3. Looks behind one side only other side shows less weight shift (less weight shift on L)  2. Turns sideways only but maintains balance  1. Needs SPV while turning  0. Needs assistance when turning    11. Turn 360 degrees  4. Able to turn 360 safely in 4 sec or less  3. Able to turn 360 one side only safely in 4 sec or less  2. Able to turn 360 safely but slowly  1.  "Needs close SPV or cueing (10 secs to turn B)  0. Needs assistance while turning      12. Place alternating LE on stool while standing unsupported  4. Able to stand independently and safely and complete 8 steps in 20 sec  3. Able to stand independently and safely and complete 8 steps in > 20 sec  2. Able to complete 4 steps without aid with SPV  1. Able to complete >2 steps min A  0. Needs assistance to keep from falling unable to try. (LE support from chair when attempting 8\" step)    13. Standing Tandem unsupported  4. Able to place foot independently and hold for 30 sec  3. Able to place foot ahead independently and hold for 30 sec  2. Able to take small step independently and hold 30 sec  1. Needs help to step but can hold 15 sec  0. Loses balance while stepping or standing    14. Single limb  4. Able to lift leg independently and hold > 10 sec  3.Able to lift leg independently and hold  5-10 sec  2. Able to lift leg independently and hold >/equal to 3 sec  1. Tries to lift leg unable to hold 3 sec but remains standing independently   0. Unable to try or needs assist to prevent fall (posterior LOB into chair both sides when attempting)    36/56      41-56 low fall risk, 21-40 medium fall risk, 0-20 high fall risk       Therapeutic Exercises (CPT 18341):     1. 5STS, 6MWT, last PN: 4/17    Therapeutic Treatments and Modalities:     1. Neuromuscular Re-education (CPT 83423), see below    2. Therapeutic Activities (CPT 65514), see below    Therapeutic Treatment and Modalities Summary: TA: Pt reporting ongoing challenges with mental health support/therapy/severe anxiety & depression with admission.  Ongoing frustrations & limitations with parental support.   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  Discussed options for inc uspport to know her rights and have needs met, CPS, .  Pt denies wanting assist at this time.  Requires freq redirection during session to focus on mobility and " physical limitations d/t [facility] on medical impairments and limitations  NMR: Standing balance: normal JESUS no UE support x 10  -feet together no UE's hz and vertical head turns x10 x2  -EC feet together, cues to ankle strategy 20sec x 3.  -step up/down airex pad x5 RLE ascending leading  -X4 L LE leading ascending  *standing rest break between balance, then seated after 20' standing balance    Lateral side stepping 2 steps ea LE x 10  Monster walks 8 steps ea side x 2(standing rest breaks between)    Time-based treatments/modalities:    Physical Therapy Timed Treatment Charges  Neuromusc re-ed, balance, coor, post minutes (CPT 88581): 31 minutes  Therapeutic activity minutes (CPT 76704): 10 minutes  ASSESSMENT:   Response to treatment:  Pt demo improving standing tolerance and balance, able to perform standing program x 30' with 1 seated rest break and int UE support.    Goals:   Short Term Goals:   Pt will be compliant with HEP to improve LE strength and core stability needed for walking without LOB. Progressing  Pt will improve miniBEST by 4 MCID to decr risk for falls. DC to more appropriate balance test  pt will report pain while walking in boot 5/10 or better consistently. Pt not in boot anymore, reports 2-3/10 pain in R foot consistently, 9.5/10 pain at max  NEW: Pt will report 7/10 R foot pain at worst while walking showing improved walking tolerance.  Short term goal time span:  2-4 weeks      Long Term Goals:    Pt will be able to demo improvement 5xSTS <12sec met with and without UE. MET (11.63 secs)  Pt will be able to improvement with 6MWT by 200ft demonstrating improved walking tolerance. Modified to more appropriate distance (only 22' improvement but CGA and only 1 LOB self-correct against wall)  Pt will be able to demo improvement on miniBEST >21, DC to more appropriate balance test.  Pt will demonstrate ability to ambulate without boot, when cleared, 150 feet without LOB to dec fall risk. MET    NEW: Pt will score 41/56 on MCCANN or better demonstrating low fall risk with functional balance activities.  NEW: Pt will demonstrate ability to ambulate 200' without LOB and no standing rest break to decrease fall risk.  NEW: Pt will improve 6MWT by 75' demonstrating improved walking tolerance.  Long term goal time span:  6-8 weeks    PLAN/RECOMMENDATIONS:   Plan for treatment: therapy treatment to continue next visit.  Planned interventions for next visit: continue with current treatment.

## 2025-05-05 ENCOUNTER — PHYSICAL THERAPY (OUTPATIENT)
Dept: PHYSICAL THERAPY | Facility: REHABILITATION | Age: 20
End: 2025-05-05
Attending: STUDENT IN AN ORGANIZED HEALTH CARE EDUCATION/TRAINING PROGRAM
Payer: COMMERCIAL

## 2025-05-05 DIAGNOSIS — R26.9 GAIT DIFFICULTY: ICD-10-CM

## 2025-05-05 DIAGNOSIS — Z15.89 MONOALLELIC MUTATION OF SPTAN1 GENE: ICD-10-CM

## 2025-05-05 DIAGNOSIS — M62.81 MUSCLE WEAKNESS (GENERALIZED): ICD-10-CM

## 2025-05-05 DIAGNOSIS — G11.4 AUTOSOMAL DOMINANT SPASTIC PARAPLEGIA (HCC): ICD-10-CM

## 2025-05-05 PROCEDURE — 97110 THERAPEUTIC EXERCISES: CPT

## 2025-05-05 NOTE — OP THERAPY DAILY TREATMENT
Outpatient Physical Therapy  DAILY TREATMENT     [facility] [address].  [address]  [place] [address]  Phone:  [phone]  Fax:  [phone]    Date: 05/05/2025    Patient: Eva Washington  YOB: 2005  MRN: [id number]     Time Calculation    Start time: 1500  Stop time: 1542 Time Calculation (min): 42 minutes         Chief Complaint: Weakness    Visit #: 24    SUBJECTIVE:   New onset of symmetrical face rash around the nose and up towards central eye brows. Notes new flank pain, left sided 5.10, previously today 9.10. Plans to go to ER today for concerns of a kidney infection. Is unirating more frequently but no burning.    OBJECTIVE:  Current objective measures:   4/17  5STS no UE: 20.81 first trial, fall into chair on 4th rep, 20.49 secs, 11.63 secs 3rd trial  TUG no AFO's: 20.53 secs  6MWT: 376' CGA, pt touched wall once, standing rest breaks from 0:30-0:45, 2:00-2:15, 3:30-3:50    MCCANN  1. Sit to stand  4. Able to stand without using hands and stabilize independently   3. Able to stand independently using hands  2. Able to stand using hands after several tries  1. Needs min A to stand or stabilize  0. Needs mod or max A to stand    2. Stand unsupported  4. Able to stand safely for 2 min  3. Able to stand 2 min with spv  2. Able to stand 30 sec unsupported  1. Needs several tries to stand unsupported  0. Unable to stand 30 sec unsupported    3. Sitting with back unsupported  4. Able to sit safely and securely for 2 min  3. Able to sit 2 min SPV  2. Able to sit 30 sec  1. Able to sit 10 sec  0. Unable to sit without support x 10 sec    4. Stand to sit  4. Sits safely min use of hands  3. Controls descent by using hands  2. Uses back of legs against chair to control descent  1. Sits independently but uncontrolled descent  0. Needs assist to sit      5. Transfers  4.Able to transfer safely minor use of hands  3.Able to transfer safely definite use of  hands  2. Able to transfer with verbal cueing or spv  1. Needs 1 person to assist  0. Needs two people to assist or spv for safety    6. Stand unsupported EC  4. Able to stand 10 sec safely  3. Able to stand 10 sec with spv  2. Able to stand 3 sec  1. Unable to keep EC 3 sec but stays safely  0. Needs help to keep from falling    7. Standing unsupported narrow JESUS  4. Able to place feet independently and stand 1 min safely  3.Able to place feet independently and stand 1 min with SPV  2. Able to place feet together independently but unable to hold 30 sec  1. Needs help to attain position but able to stand 15 seconds feet together  0. Needs help to attain position but unable to stand 15 seconds feet together    8. Reaching forward outstretched arm  4. Can reach forward confidently 25 cm (10 in)  3. Can reach forward 12cm (5 in)  2. Can reach forward 5 cm ( 2 in)  1. Reaches forward but needs spv  0. Loses balance jyoti trying/requires external support    9.  object from floor in standing  4. Able to  slipper safely and easily  3. Able to  slipper but needs spv  2. Unable to  but reaches 2-5cm (1-2in) from slipper and keeps balance independently   1. Unable to  and needs spv to try  0. Unable to try, needs assist to avoid fall or loss of balance    10. Turn to look behind over left and right shoulders  4. Looks behind from both sides and weight shifts well  3. Looks behind one side only other side shows less weight shift (less weight shift on L)  2. Turns sideways only but maintains balance  1. Needs SPV while turning  0. Needs assistance when turning    11. Turn 360 degrees  4. Able to turn 360 safely in 4 sec or less  3. Able to turn 360 one side only safely in 4 sec or less  2. Able to turn 360 safely but slowly  1. Needs close SPV or cueing (10 secs to turn B)  0. Needs assistance while turning      12. Place alternating LE on stool while standing unsupported  4. Able to stand  "independently and safely and complete 8 steps in 20 sec  3. Able to stand independently and safely and complete 8 steps in > 20 sec  2. Able to complete 4 steps without aid with SPV  1. Able to complete >2 steps min A  0. Needs assistance to keep from falling unable to try. (LE support from chair when attempting 8\" step)    13. Standing Tandem unsupported  4. Able to place foot independently and hold for 30 sec  3. Able to place foot ahead independently and hold for 30 sec  2. Able to take small step independently and hold 30 sec  1. Needs help to step but can hold 15 sec  0. Loses balance while stepping or standing    14. Single limb  4. Able to lift leg independently and hold > 10 sec  3.Able to lift leg independently and hold  5-10 sec  2. Able to lift leg independently and hold >/equal to 3 sec  1. Tries to lift leg unable to hold 3 sec but remains standing independently   0. Unable to try or needs assist to prevent fall (posterior LOB into chair both sides when attempting)    36/56      41-56 low fall risk, 21-40 medium fall risk, 0-20 high fall risk       Therapeutic Exercises (CPT 03034):     1. temp 97.5    2. bridge hold, 4 x 1 min    3. ball bridge hold, 6 x fatigue    4. fig 4 bridge, 3 x 12    5. STS no UE elevated table 22\", 2 x 12, last PN: 4/17    Therapeutic Treatments and Modalities:     Therapeutic Treatment and Modalities Summary:       Time-based treatments/modalities:    Physical Therapy Timed Treatment Charges  Therapeutic exercise minutes (CPT 31757): 42 minutes  ASSESSMENT:   Response to treatment:  Encouraged pt to seek urgent care for concerns for UTI, possible kidney infection with pt experiencing chills, not feverish currently. Encouraged her to discuss the current new symmetrical face rash present today, somewhat butterfly in nature but also extends up over nasal bridge to her eyebrows.   Session limited today due to flank pain.     PLAN/RECOMMENDATIONS:   Plan for treatment: therapy " treatment to continue next visit.  Planned interventions for next visit: continue with current treatment.

## 2025-05-14 NOTE — OP THERAPY DAILY TREATMENT
Outpatient Physical Therapy  DAILY TREATMENT     [facility] Physical [address].  [address]  [place] [address]  Phone:  [phone]  Fax:  [phone]    Date: 05/15/2025    Patient: Eva Washington  YOB: 2005  MRN: [id number]     Time Calculation    Start time: 0916  Stop time: 0957 Time Calculation (min): 41 minutes         Chief Complaint: Loss Of Balance, Difficulty Walking, and Weakness    Visit #: 25    SUBJECTIVE:   UTI cleared up. Feels a lot better.   Wants to work on being able to walk longer and with less tripping/LOBs. Was able to walk in from the parking lot without UE touches. Denies any foot pain with walking lately    OBJECTIVE:  Current objective measures:   4/17  5STS no UE: 20.81 first trial, fall into chair on 4th rep, 20.49 secs, 11.63 secs 3rd trial  TUG no AFO's: 20.53 secs  6MWT: 376' CGA, pt touched wall once, standing rest breaks from 0:30-0:45, 2:00-2:15, 3:30-3:50    MCCANN  1. Sit to stand  36/56      41-56 low fall risk, 21-40 medium fall risk, 0-20 high fall risk       5/15  5STS no UE 12.83  TUG 18.7 SPV  6MWT: 412 feet, no wall touches, 2 max A for LOB. Mod RPE 2/10.  MCCANN  1. Sit to stand  4. Able to stand without using hands and stabilize independently       2. Stand unsupported  4. Able to stand safely for 2 min      3. Sitting with back unsupported  4. Able to sit safely and securely for 2 min    4. Stand to sit  4. Sits safely min use of hands      5. Transfers  4.Able to transfer safely minor use of hands        6. Stand unsupported EC  4      7. Standing unsupported narrow JESUS  4      8. Reaching forward outstretched arm  1. Reaches forward but needs spv      9.  object from floor in standing  3. Able to  slipper but needs spv      10. Turn to look behind over left and right shoulders  2. Turns sideways only but maintains balance    11. Turn 360 degrees  2. Able to turn 360 safely but slowly        12. Place  alternating LE on stool while standing unsupported  1. Able to complete >2 steps min A      13. Standing Tandem unsupported  0. Loses balance while stepping or standing    14. Single limb  0. Unable to try or needs assist to prevent fall   37/56    41-56 low fall risk, 21-40 medium fall risk, 0-20 high fall risk       Therapeutic Exercises (CPT 00572):     1. 5STS, TUG, 6MWT see objective    2. [place] carry 160 feet, CGA, 5# DB 1st attempt Rt UE carry 3 min, sig improved stopping to recover rather tahn foot catching and pushing through to need assist to recovery 2nd attempt CGA but started laughing at about 60% and became total A x 5 events, could not refocus After sitting rest able to walk to lobby no LOB    Therapeutic Treatments and Modalities:     Therapeutic Treatment and Modalities Summary:  SIOBHAN see objective      Time-based treatments/modalities:    Physical Therapy Timed Treatment Charges  Neuromusc re-ed, balance, coor, post minutes (CPT 27065): 13 minutes  Therapeutic exercise minutes (CPT 57879): 28 minutes  ASSESSMENT:   Response to treatment:  Due to scheduling difficulties pt has only completed 2 follow ups since last PN for severe balance and gait deficits as well as weakness secondary to genetic condition further complicated by December foot surgery. Since last PN she demos significant improvement in functional LE strength with 5STS sore improved to 12 seconds. As well as good improvement in CV endurance with 6MWT, however did have 2 LOBs. Functionally, she has also improved in her abilitty to more consistently ambulating without wall touch. Did demo severe fatigue, post 6MWT, resulting in multiple events of total assist to prevent a fall. She will cont to benefit from skilled PT 1 x 8        Goals:   Short Term Goals:   1. Pt will be compliant with HEP to improve LE strength and core stability needed for walking without LOB. Progressing  2. Pt will report 7/10 R foot pain at worst while walking  showing improved walking tolerance. met  3. Pt will demonstrate improved functional mobility with TUG score 17 sec or better without LOB. Improved 18 sec  Short term goal time span:  2-4 weeks      Long Term Goals:    1. Pt will score 41/56 on MCCANN or better demonstrating low fall risk with functional balance activities. cont  2. Pt will demonstrate ability to ambulate 200' without LOB and no standing rest break to decrease fall risk. Cont, inconsistent   3. Pt will improve 6MWT by 75' demonstrating improved walking tolerance without physical assistance for LOB. Improving, cont  PLAN/RECOMMENDATIONS:   Plan for treatment: therapy treatment to continue next visit.  Planned interventions for next visit: continue with current treatment.

## 2025-05-15 ENCOUNTER — PHYSICAL THERAPY (OUTPATIENT)
Dept: PHYSICAL THERAPY | Facility: REHABILITATION | Age: 20
End: 2025-05-15
Attending: STUDENT IN AN ORGANIZED HEALTH CARE EDUCATION/TRAINING PROGRAM
Payer: COMMERCIAL

## 2025-05-15 DIAGNOSIS — G11.4 AUTOSOMAL DOMINANT SPASTIC PARAPLEGIA (HCC): ICD-10-CM

## 2025-05-15 DIAGNOSIS — R26.9 GAIT DIFFICULTY: ICD-10-CM

## 2025-05-15 DIAGNOSIS — Z15.89 MONOALLELIC MUTATION OF SPTAN1 GENE: Primary | ICD-10-CM

## 2025-05-15 PROCEDURE — 97112 NEUROMUSCULAR REEDUCATION: CPT

## 2025-05-15 PROCEDURE — 97110 THERAPEUTIC EXERCISES: CPT

## 2025-05-15 NOTE — OP THERAPY PROGRESS SUMMARY
Outpatient Physical Therapy  PROGRESS SUMMARY NOTE      [facility] Therapy [place] [address]  [address]  [facility] [address]  Phone:  [phone]  Fax:  [phone]    Date of Visit: 05/15/2025    Patient: Eva Washington  YOB: 2005  MRN: [id number]     Referring Provider: Reed De Los Santos M.D.  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 Gait difficulty  R26.9       Rehab Potential: good    Progress Report Period: 4/17/25-5/15/25    Functional Assessment Used      5STS, TUG, 6MWT, MCCANN    Objective Findings and Assessment:   Patient progression towards goals: Due to scheduling difficulties pt has only completed 2 follow ups since last PN for severe balance and gait deficits as well as weakness secondary to genetic condition further complicated by December foot surgery. Since last PN she demos significant improvement in functional LE strength with 5STS sore improved to 12 seconds. As well as good improvement in CV endurance with 6MWT, however did have 2 LOBs. Functionally, she has also improved in her abilitty to more consistently ambulating without wall touch. Did demo severe fatigue, post 6MWT, resulting in multiple events of total assist to prevent a fall. She will cont to benefit from skilled PT 1 x 8           Goals:   Short Term Goals:   1. Pt will be compliant with HEP to improve LE strength and core stability needed for walking without LOB. Progressing  2. Pt will report 7/10 R foot pain at  worst while walking showing improved walking tolerance. met  3. Pt will demonstrate improved functional mobility with TUG score 17 sec or better without LOB. Improved 18 sec  Short term goal time span:  2-4 weeks      Long Term Goals:    1. Pt will score 41/56 on MCCANN or better demonstrating low fall risk with functional balance activities. cont  2. Pt will demonstrate ability to ambulate 200' without LOB and no standing rest break to decrease fall risk. Cont, inconsistent   3. Pt will improve 6MWT by 75' demonstrating improved walking tolerance without physical assistance for LOB. Improving, cont      Objective findings and assessment details: STS no UE 12.83  TUG 18.7 SPV  6MWT: 412 feet, no wall touches, 2 max A for LOB. Mod RPE 2/10.  MCCANN 37/56    Goals:   Short Term Goals:   1. Pt will be compliant with HEP to improve LE strength and core stability needed for walking without LOB.   2. Pt will demonstrate improved functional mobility with TUG score 17 sec or better without LOB.  Short term goal time span:  2-4 weeks      Long Term Goals:    1. Pt will score 41/56 on MCCANN or better demonstrating low fall risk with functional balance activities.   2. Pt will demonstrate ability to ambulate 200' without LOB and no standing rest break to decrease fall risk.   3. Pt will improve 6MWT by 75' demonstrating improved walking tolerance without physical assistance for LOB.  Long term goal time span:  6-8 weeks    Plan:   Planned therapy interventions:  Neuromuscular Re-education (CPT 05293), Gait Training (CPT 80408), Therapeutic Exercise (CPT 73203) and Therapeutic Activities (CPT 57818)  Frequency:  1x week  Duration in weeks:  8      Referring provider co-signature:  I have reviewed this plan of care and my co-signature certifies the need for services.     Certification Period: 05/15/2025 to 07/10/25    Physician Signature: ________________________________ Date: ______________

## 2025-05-19 ENCOUNTER — OFFICE VISIT (OUTPATIENT)
Dept: SLEEP MEDICINE | Facility: MEDICAL CENTER | Age: 20
End: 2025-05-19
Attending: STUDENT IN AN ORGANIZED HEALTH CARE EDUCATION/TRAINING PROGRAM
Payer: COMMERCIAL

## 2025-05-19 VITALS
SYSTOLIC BLOOD PRESSURE: 116 MMHG | WEIGHT: 164 LBS | BODY MASS INDEX: 27.32 KG/M2 | OXYGEN SATURATION: 96 % | DIASTOLIC BLOOD PRESSURE: 70 MMHG | HEART RATE: 86 BPM | HEIGHT: 65 IN

## 2025-05-19 DIAGNOSIS — J44.9 OBSTRUCTIVE AIRWAY DISEASE (HCC): ICD-10-CM

## 2025-05-19 DIAGNOSIS — J45.20 MILD INTERMITTENT ASTHMA WITHOUT COMPLICATION: ICD-10-CM

## 2025-05-19 DIAGNOSIS — Z15.89 MONOALLELIC MUTATION OF SPTAN1 GENE: Primary | ICD-10-CM

## 2025-05-19 DIAGNOSIS — R06.02 SHORTNESS OF BREATH: ICD-10-CM

## 2025-05-19 PROCEDURE — 3078F DIAST BP <80 MM HG: CPT | Performed by: INTERNAL MEDICINE

## 2025-05-19 PROCEDURE — 99204 OFFICE O/P NEW MOD 45 MIN: CPT | Performed by: INTERNAL MEDICINE

## 2025-05-19 PROCEDURE — 99213 OFFICE O/P EST LOW 20 MIN: CPT | Performed by: STUDENT IN AN ORGANIZED HEALTH CARE EDUCATION/TRAINING PROGRAM

## 2025-05-19 PROCEDURE — 3074F SYST BP LT 130 MM HG: CPT | Performed by: INTERNAL MEDICINE

## 2025-05-19 RX ORDER — ALBUTEROL SULFATE 90 UG/1
2 INHALANT RESPIRATORY (INHALATION) EVERY 4 HOURS PRN
Qty: 1 EACH | Refills: 6 | Status: SHIPPED | OUTPATIENT
Start: 2025-05-19

## 2025-05-19 RX ORDER — BUDESONIDE AND FORMOTEROL FUMARATE DIHYDRATE 80; 4.5 UG/1; UG/1
2 AEROSOL RESPIRATORY (INHALATION) 2 TIMES DAILY
Qty: 1 EACH | Refills: 11 | Status: SHIPPED | OUTPATIENT
Start: 2025-05-19

## 2025-05-19 ASSESSMENT — ENCOUNTER SYMPTOMS
CHILLS: 0
ABDOMINAL PAIN: 0
PND: 0
SINUS PAIN: 0
CONSTIPATION: 0
DIZZINESS: 0
CLAUDICATION: 0
EYE DISCHARGE: 0
WHEEZING: 0
ORTHOPNEA: 0
EYE REDNESS: 0
DEPRESSION: 0
EYE PAIN: 0
FALLS: 0
NAUSEA: 0
HEADACHES: 0
SPUTUM PRODUCTION: 0
VOMITING: 0
SPEECH CHANGE: 0
DOUBLE VISION: 0
BACK PAIN: 0
DIAPHORESIS: 0
FOCAL WEAKNESS: 0
SHORTNESS OF BREATH: 0
BLURRED VISION: 0
TREMORS: 0
FEVER: 0
COUGH: 0
WEAKNESS: 0
HEMOPTYSIS: 0
DIARRHEA: 0
PALPITATIONS: 0
NECK PAIN: 0
MYALGIAS: 0
STRIDOR: 0
HEARTBURN: 0
SORE THROAT: 0
WEIGHT LOSS: 0
PHOTOPHOBIA: 0

## 2025-05-19 ASSESSMENT — FIBROSIS 4 INDEX: FIB4 SCORE: 0.26

## 2025-05-19 NOTE — PROGRESS NOTES
Chief Complaint   Patient presents with    New Patient     REF BY DR. DELGADO FOR  Reactive airway disease without complication, unspecified asthma severity, unspecified whether persistent, ABNORMAL PFT    Results     PFT 6/27/24       HPI: This patient is a 19 y.o. female presenting for evaluation of asthma. PMHx is significant for a recent dx of abnormal gait with associated leg weakness in the setting of heterozygosity for pathogenic SPTAN1 variant which can be variable in phenotype. During her w/u she had PFT in June of last year showing normal air flows with +BD response and associated air trapping suggesting possible RAD.  In association with this, she does report intermittent SOB with associated cough and wheezing over the past 7 mos. Triggers for her sxs include activity such as prolonged walking, cleaning and certain smells. She reports only mild allergies in the spring. She has albuterol which helps the wheezing but is not long lasting. She recently tried her mother's controller inhaler which was helpful. She does not smoke. No smokeless tobacco or vape pen use. She is about to start a new job but has no environmental or occupational exposures.  She has a mom and a sister with asthma.  She has mild allergy sxs in the spring but has never formally been seen by an allergist.     Past Medical History[1]    Social History     Socioeconomic History    Marital status: Single     Spouse name: Not on file    Number of children: Not on file    Years of education: Not on file    Highest education level: Not on file   Occupational History    Not on file   Tobacco Use    Smoking status: Never    Smokeless tobacco: Never   Vaping Use    Vaping status: Never Used   Substance and Sexual Activity    Alcohol use: No    Drug use: No    Sexual activity: Not Currently   Other Topics Concern    Behavioral problems Not Asked    Interpersonal relationships Not Asked    Sad or not enjoying activities Not Asked    Suicidal thoughts  Not Asked    Poor school performance Not Asked    Reading difficulties Not Asked    Speech difficulties Not Asked    Writing difficulties Not Asked    Inadequate sleep Not Asked    Excessive TV viewing Not Asked    Excessive video game use Not Asked    Inadequate exercise Not Asked    Sports related Not Asked    Poor diet Not Asked    Family concerns for drug/alcohol abuse Not Asked    Poor oral hygiene Not Asked    Bike safety Not Asked    Family concerns vehicle safety Not Asked   Social History Narrative    Not on file     Social Drivers of Health     Financial Resource Strain: Not on file   Food Insecurity: Not on file   Transportation Needs: Not on file   Physical Activity: Not on file   Stress: Not on file   Social Connections: Not on file   Intimate Partner Violence: Not on file   Housing Stability: Not on file       Family History   Problem Relation Age of Onset    Asthma Mother     Fibromyalgia Mother     Hypertension Mother     Hyperlipidemia Mother     Autoimmune Disease Mother     Thyroid Mother     Seizures Father     Stroke Father     Hyperlipidemia Father     Hypertension Father     Atrial fibrillation Father     Bipolar disorder Sister     Depression Sister     Seizures Sister     Neuromuscular disorder Brother     Cancer Maternal Uncle     Cancer Maternal Uncle     Diabetes Maternal Grandmother     Heart Disease Maternal Grandmother     Heart Failure Maternal Grandmother     COPD Maternal Grandmother     Cancer Maternal Grandfather     Diabetes Paternal Grandmother        Medications Ordered Prior to Encounter[2]    Allergies: Tegretol [carbamazepine] and Tree nuts food allergy    ROS:   Review of Systems   Constitutional:  Negative for chills, diaphoresis, fever, malaise/fatigue and weight loss.   HENT:  Negative for congestion, ear discharge, ear pain, hearing loss, nosebleeds, sinus pain, sore throat and tinnitus.    Eyes:  Negative for blurred vision, double vision, photophobia, pain, discharge  "and redness.   Respiratory:  Negative for cough, hemoptysis, sputum production, shortness of breath, wheezing and stridor.    Cardiovascular:  Negative for chest pain, palpitations, orthopnea, claudication, leg swelling and PND.   Gastrointestinal:  Negative for abdominal pain, constipation, diarrhea, heartburn, nausea and vomiting.   Genitourinary:  Negative for dysuria and urgency.   Musculoskeletal:  Negative for back pain, falls, joint pain, myalgias and neck pain.   Skin:  Negative for itching and rash.   Neurological:  Negative for dizziness, tremors, speech change, focal weakness, weakness and headaches.   Endo/Heme/Allergies:  Negative for environmental allergies.   Psychiatric/Behavioral:  Negative for depression.        /70 (BP Location: Right arm, Patient Position: Sitting, BP Cuff Size: Adult)   Pulse 86   Ht 1.651 m (5' 5\")   Wt 74.4 kg (164 lb)   SpO2 96%     Physical Exam:  Physical Exam  Constitutional:       General: She is not in acute distress.     Appearance: Normal appearance. She is well-developed and normal weight.   HENT:      Head: Normocephalic and atraumatic.      Right Ear: External ear normal.      Left Ear: External ear normal.      Nose: Nose normal. No congestion.      Mouth/Throat:      Mouth: Mucous membranes are moist.      Pharynx: Oropharynx is clear. No oropharyngeal exudate.   Eyes:      General: No scleral icterus.     Extraocular Movements: Extraocular movements intact.      Conjunctiva/sclera: Conjunctivae normal.      Pupils: Pupils are equal, round, and reactive to light.   Neck:      Vascular: No JVD.      Trachea: No tracheal deviation.   Cardiovascular:      Rate and Rhythm: Normal rate and regular rhythm.      Heart sounds: Normal heart sounds. No murmur heard.     No friction rub. No gallop.   Pulmonary:      Effort: Pulmonary effort is normal. No accessory muscle usage or respiratory distress.      Breath sounds: Normal breath sounds. No wheezing or rales. " "  Abdominal:      General: There is no distension.      Palpations: Abdomen is soft.      Tenderness: There is no abdominal tenderness.   Musculoskeletal:         General: No tenderness or deformity. Normal range of motion.      Cervical back: Normal range of motion and neck supple.      Right lower leg: No edema.      Left lower leg: No edema.   Lymphadenopathy:      Cervical: No cervical adenopathy.   Skin:     General: Skin is warm and dry.      Findings: No rash.      Nails: There is no clubbing.   Neurological:      Mental Status: She is alert and oriented to person, place, and time.      Cranial Nerves: No cranial nerve deficit.      Gait: Gait normal.   Psychiatric:         Behavior: Behavior normal.         PFTs as reviewed by me personally: as per hPI    Imaging as reviewed by me personally: as per HPI    Assessment:  1. Monoallelic mutation of SPTAN1 gene        2. Shortness of breath  budesonide-formoterol (SYMBICORT) 80-4.5 MCG/ACT Aerosol      3. Obstructive airway disease (HCC)  budesonide-formoterol (SYMBICORT) 80-4.5 MCG/ACT Aerosol    Spirometry          Plan:  Per my  brief literature review, there does not appear to be any pulmonary symptoms associated with most variants.   This is somewhat new and both sxs with associated triggers and PFT are c/w RAD. I will start her on low dose ICS/LABA with prn CHAPARRITA and repeat spirometry at f/u.  See above.   Return in about 3 months (around 8/19/2025) for spirometry at time of f/u.             [1]   Past Medical History:  Diagnosis Date    Anxiety     Asthma     being worked up    Dental disorder     BRACES ON BOTTOM; WEARS RETAINER    Depression     Heart murmur     pah (PULMONARY ARTIERAL HYPERTENSION) CARDIOLOGY CLEARED    Hemorrhagic disorder (HCC)     \"bleeds easily\"-not officially diagnosed    Migraines     Muscle disorder     Psychiatric problem     Anxiety    Pulmonary artery hypertension (HCC)     now being followed by cardiologist    Seizure ([facility])  "    Febrile seizure at 13 months old.    Shortness of breath    [2]   Current Outpatient Medications on File Prior to Visit   Medication Sig Dispense Refill    Ubrogepant (UBRELVY) 100 MG Tab Take 100 mg by mouth as needed (take 100 mg at onset of migraine.  May repeat dose in 2 hours if unrelieved.  Not to exceed 2 tablets in 24 hours.) for up to 30 days. 10 Tablet 5    lamoTRIgine (LAMICTAL) 25 MG Tab Take 50 mg by mouth every day.      citalopram (CELEXA) 20 MG Tab Take 20 mg by mouth every day.      zolpidem (AMBIEN) 5 MG Tab Take 5 mg by mouth at bedtime as needed for Sleep.      traZODone (DESYREL) 100 MG Tab Take 100 mg by mouth every evening.      omeprazole (PRILOSEC) 40 MG delayed-release capsule Take 40 mg by mouth every day.         hydrOXYzine pamoate (VISTARIL) 50 MG Cap Take 50 mg by mouth 4 times a day as needed for Anxiety.      albuterol 108 (90 Base) MCG/ACT Aero Soln inhalation aerosol Inhale 2 Puffs every four hours as needed for Shortness of Breath. 1 Each 1    sulfamethoxazole-trimethoprim (BACTRIM DS) 800-160 MG tablet Take 1 Tablet by mouth 2 times a day. (Patient not taking: Reported on 3/19/2025) 14 Tablet 0    ondansetron (ZOFRAN ODT) 8 MG TABLET DISPERSIBLE Take 8 mg by mouth every 12 hours as needed for Nausea. (Patient not taking: Reported on 5/19/2025)      carBAMazepine SR (TEGRETOL XR) 100 MG TABLET SR 12 HR Take 100 mg by mouth at bedtime. (Patient not taking: Reported on 3/19/2025)      gabapentin (NEURONTIN) 300 MG Cap 1 po q hs prn nerve pain (Patient not taking: Reported on 12/30/2024) 14 Capsule 0    Spacer/Aero-Holding Chambers (OPTICHAMBER RUPINDER) Misc 1 EACH ONE TIME FOR 1 DOSE.      Ferrous Sulfate (IRON PO) Take 1 Tablet by mouth every day. (Patient not taking: Reported on 3/19/2025)       No current facility-administered medications on file prior to visit.

## 2025-05-20 ENCOUNTER — PHYSICAL THERAPY (OUTPATIENT)
Dept: PHYSICAL THERAPY | Facility: REHABILITATION | Age: 20
End: 2025-05-20
Attending: STUDENT IN AN ORGANIZED HEALTH CARE EDUCATION/TRAINING PROGRAM
Payer: COMMERCIAL

## 2025-05-20 DIAGNOSIS — G11.9 CEREBELLAR ATAXIA (HCC): ICD-10-CM

## 2025-05-20 DIAGNOSIS — Z91.81 FALLS INFREQUENTLY: ICD-10-CM

## 2025-05-20 DIAGNOSIS — G11.4 SPASTIC PARAPLEGIA, HEREDITARY (HCC): ICD-10-CM

## 2025-05-20 DIAGNOSIS — Z15.89 MONOALLELIC MUTATION OF SPTAN1 GENE: Primary | ICD-10-CM

## 2025-05-20 DIAGNOSIS — R26.9 GAIT DIFFICULTY: ICD-10-CM

## 2025-05-20 PROCEDURE — RXMED WILLOW AMBULATORY MEDICATION CHARGE: Performed by: STUDENT IN AN ORGANIZED HEALTH CARE EDUCATION/TRAINING PROGRAM

## 2025-05-20 PROCEDURE — 97110 THERAPEUTIC EXERCISES: CPT

## 2025-05-20 PROCEDURE — 97112 NEUROMUSCULAR REEDUCATION: CPT

## 2025-05-20 NOTE — OP THERAPY DAILY TREATMENT
Outpatient Physical Therapy  DAILY TREATMENT     [facility] Physical Therapy [address].  [address]  [place] [address]  Phone:  [phone]  Fax:  [phone]    Date: 05/20/2025    Patient: Eva Washington  YOB: 2005  MRN: [id number]     Time Calculation                   Chief Complaint: No chief complaint on file.    Visit #: 26    SUBJECTIVE:   Doing HEP intermittently, had pass a kidney stone    OBJECTIVE:  Current objective measures:   4/17  5STS no UE: 20.81 first trial, fall into chair on 4th rep, 20.49 secs, 11.63 secs 3rd trial  TUG no AFO's: 20.53 secs  6MWT: 376' CGA, pt touched wall once, standing rest breaks from 0:30-0:45, 2:00-2:15, 3:30-3:50    MCCANN  1. Sit to stand  36/56      41-56 low fall risk, 21-40 medium fall risk, 0-20 high fall risk       5/15  5STS no UE 12.83  TUG 18.7 SPV  6MWT: 412 feet, no wall touches, 2 max A for LOB. Mod RPE 2/10.  MCCANN  1. Sit to stand  4. Able to stand without using hands and stabilize independently       2. Stand unsupported  4. Able to stand safely for 2 min      3. Sitting with back unsupported  4. Able to sit safely and securely for 2 min    4. Stand to sit  4. Sits safely min use of hands      5. Transfers  4.Able to transfer safely minor use of hands        6. Stand unsupported EC  4      7. Standing unsupported narrow JESUS  4      8. Reaching forward outstretched arm  1. Reaches forward but needs spv      9.  object from floor in standing  3. Able to  slipper but needs spv      10. Turn to look behind over left and right shoulders  2. Turns sideways only but maintains balance    11. Turn 360 degrees  2. Able to turn 360 safely but slowly        12. Place alternating LE on stool while standing unsupported  1. Able to complete >2 steps min A      13. Standing Tandem unsupported  0. Loses balance while stepping or standing    14. Single limb  0. Unable to try or needs assist to prevent fall    "37/56    41-56 low fall risk, 21-40 medium fall risk, 0-20 high fall risk       Therapeutic Exercises (CPT 26328):     1. 5STS, TUG, 6MWT see objective, NT    2. [place] carry 160 feet, CGA, 5# DB 1st attempt Rt UE carry 3 min, sig improved stopping to recover rather tahn foot catching and pushing through to need assist to recovery 2nd attempt CGA but started laughing at about 60% and became total A x 5 events, could not refocus After sitting rest able to walk to [facility] no LOB, NT    3. Nu step, L1-2 x 15', no charge      Therapeutic Exercise Summary: Access Code: [email or web address]  URL: [email or web address]  Date: 05/20/2025  Prepared by: Shraddha Mann    Exercises  - Side Stepping with Resistance at Thighs  - 1 x daily - 7 x weekly - 3 sets - 10 reps  - Bird Dog  - 1 x daily - 7 x weekly - 3 sets - 10 reps  - BOSU® Ball Kneel  - 1 x daily - 7 x weekly - 3 sets - 10 reps  - BOSU® Ball Single Leg Kneel  - 1 x daily - 7 x weekly - 3 sets - 10 reps  - Tall Kneel Vertical Bridge  - 1 x daily - 7 x weekly - 3 sets - 10 reps    Therapeutic Treatments and Modalities:     1. Neuromuscular Re-education (CPT 16665), see below    Therapeutic Treatment and Modalities Summary: NMR: standing balance to improve LE strength, coordination and balance integation.   Standing 1 LE on 4\" step :   -static holds unsupported: hz head turns on ea Silvano 10, EC 30sec on L LE; cues to not use UE's while stepping up or down  10-20 sec on R LE, multiple attempts, tc's faciliatating back core, minimal improvement, unable to maintain greater 20sec  -standing warrior 2 for inc hip abd/ER  X30sec with L LE posteriorly EC  X35 sec RLE posteriorly EC  Fwd/retro walking unbanded x25'/retro x20ft 1 max LOB anteriorly  Lateral banded stepping 10'x 4 , cues to maintain knee flex  Glut med strengthening: static toe taps orange band x 15 ea-cues to maintain knee flex  Mod bird dogs>progressed bird dogs x 10 ea  Kneeling, focus on hip abd IR w/o ADD. Knee " bridging x 10  Kneeling>half kneeling: unable with/out and and with UE support d/t fatigue      Time-based treatments/modalities:       ASSESSMENT:   Response to treatment:  Demo improving hip ER/ABD strength in standing balance ex's and with use of tband.  Cont to demo impaired LE endurance and spasticity limiting safety with gait and balance.         Goals:   Short Term Goals:   1. Pt will be compliant with HEP to improve LE strength and core stability needed for walking without LOB. Progressing  2. Pt will report 7/10 R foot pain at worst while walking showing improved walking tolerance. met  3. Pt will demonstrate improved functional mobility with TUG score 17 sec or better without LOB. Improved 18 sec  Short term goal time span:  2-4 weeks      Long Term Goals:    1. Pt will score 41/56 on MCCANN or better demonstrating low fall risk with functional balance activities. cont  2. Pt will demonstrate ability to ambulate 200' without LOB and no standing rest break to decrease fall risk. Cont, inconsistent   3. Pt will improve 6MWT by 75' demonstrating improved walking tolerance without physical assistance for LOB. Improving, cont  PLAN/RECOMMENDATIONS:   Plan for treatment: therapy treatment to continue next visit.  Planned interventions for next visit: continue with current treatment.

## 2025-05-30 ENCOUNTER — PHARMACY VISIT (OUTPATIENT)
Dept: PHARMACY | Facility: MEDICAL CENTER | Age: 20
End: 2025-05-30
Payer: COMMERCIAL

## 2025-06-05 ENCOUNTER — NON-PROVIDER VISIT (OUTPATIENT)
Dept: SLEEP MEDICINE | Facility: MEDICAL CENTER | Age: 20
End: 2025-06-05
Attending: INTERNAL MEDICINE
Payer: COMMERCIAL

## 2025-06-05 VITALS — WEIGHT: 161 LBS | BODY MASS INDEX: 26.82 KG/M2 | HEIGHT: 65 IN

## 2025-06-05 DIAGNOSIS — J44.9 OBSTRUCTIVE AIRWAY DISEASE (HCC): ICD-10-CM

## 2025-06-05 PROCEDURE — 94060 EVALUATION OF WHEEZING: CPT | Performed by: INTERNAL MEDICINE

## 2025-06-05 PROCEDURE — 94060 EVALUATION OF WHEEZING: CPT | Mod: 26 | Performed by: INTERNAL MEDICINE

## 2025-06-05 ASSESSMENT — FIBROSIS 4 INDEX: FIB4 SCORE: 0.26

## 2025-06-05 NOTE — PROCEDURES
Technician: Dana Bell [facility]  Tech notes:  Good pt effort and cooperation. ATS standards met.  Albuterol HFA 2 puffs via spacer administered.    Interpretation:  There is no significant obstructive ventilatory defect on spirometry.  There is a partial bronchodilator response, most pronounced in the mid flow rates.  In the absence of spirometric evidence of obstruction, use symptom response to determine if the use of short acting bronchodilators is clinically appropriate.    Flow-volume loop is consistent with the above interpretation.    Compared to prior testing in 2024, the FEV1 has remained stable at 3.43 L, or 99% predicted.    IArmin M.D. am the author of this note (PFT interpretation).  __________  Armin Jean MD  Pulmonary and Critical Care Medicine  Formerly [facility]

## 2025-06-06 ENCOUNTER — PHYSICAL THERAPY (OUTPATIENT)
Dept: PHYSICAL THERAPY | Facility: REHABILITATION | Age: 20
End: 2025-06-06
Attending: STUDENT IN AN ORGANIZED HEALTH CARE EDUCATION/TRAINING PROGRAM
Payer: COMMERCIAL

## 2025-06-06 DIAGNOSIS — Z91.81 FALLS INFREQUENTLY: ICD-10-CM

## 2025-06-06 DIAGNOSIS — R26.9 GAIT DIFFICULTY: ICD-10-CM

## 2025-06-06 DIAGNOSIS — Z15.89 MONOALLELIC MUTATION OF SPTAN1 GENE: Primary | ICD-10-CM

## 2025-06-06 DIAGNOSIS — G11.4 SPASTIC PARAPLEGIA, HEREDITARY (HCC): ICD-10-CM

## 2025-06-06 DIAGNOSIS — R29.898 LEG WEAKNESS, BILATERAL: ICD-10-CM

## 2025-06-06 DIAGNOSIS — G11.9 CEREBELLAR ATAXIA (HCC): ICD-10-CM

## 2025-06-06 DIAGNOSIS — Z74.09 IMPAIRED FUNCTIONAL MOBILITY, BALANCE, GAIT, AND ENDURANCE: ICD-10-CM

## 2025-06-06 DIAGNOSIS — M62.81 MUSCLE WEAKNESS (GENERALIZED): ICD-10-CM

## 2025-06-06 PROCEDURE — 97110 THERAPEUTIC EXERCISES: CPT

## 2025-06-06 PROCEDURE — 97112 NEUROMUSCULAR REEDUCATION: CPT

## 2025-06-06 NOTE — OP THERAPY DAILY TREATMENT
Outpatient Physical Therapy  DAILY TREATMENT     [facility] Physical [address].  [address]  [place] [address]  Phone:  [phone]  Fax:  [phone]    Date: 06/06/2025    Patient: Eva Washington  YOB: 2005  MRN: [id number]     Time Calculation    Start time: 1345  Stop time: 1430 Time Calculation (min): 45 minutes         Chief Complaint: Difficulty Walking, Weakness, and Loss Of Balance    Visit #: 27    SUBJECTIVE:   Pt presents no new complaints or concerns.  Reports job interview on Monday 6/9/25.  Cont to verbalize goal to increase indep outside of home.     OBJECTIVE:  Current objective measures:   4/17  5STS no UE: 20.81 first trial, fall into chair on 4th rep, 20.49 secs, 11.63 secs 3rd trial  TUG no AFO's: 20.53 secs  6MWT: 376' CGA, pt touched wall once, standing rest breaks from 0:30-0:45, 2:00-2:15, 3:30-3:50    MCCANN  1. Sit to stand  36/56      41-56 low fall risk, 21-40 medium fall risk, 0-20 high fall risk       5/15  5STS no UE 12.83  TUG 18.7 SPV  6MWT: 412 feet, no wall touches, 2 max A for LOB. Mod RPE 2/10.  MCCANN  1. Sit to stand  4. Able to stand without using hands and stabilize independently       2. Stand unsupported  4. Able to stand safely for 2 min      3. Sitting with back unsupported  4. Able to sit safely and securely for 2 min    4. Stand to sit  4. Sits safely min use of hands      5. Transfers  4.Able to transfer safely minor use of hands        6. Stand unsupported EC  4      7. Standing unsupported narrow JESUS  4      8. Reaching forward outstretched arm  1. Reaches forward but needs spv      9.  object from floor in standing  3. Able to  slipper but needs spv      10. Turn to look behind over left and right shoulders  2. Turns sideways only but maintains balance    11. Turn 360 degrees  2. Able to turn 360 safely but slowly        12. Place alternating LE on stool while standing unsupported  1. Able to complete  ">2 steps min A      13. Standing Tandem unsupported  0. Loses balance while stepping or standing    14. Single limb  0. Unable to try or needs assist to prevent fall   37/56    41-56 low fall risk, 21-40 medium fall risk, 0-20 high fall risk       Therapeutic Exercises (CPT 84615):     1. 5STS, TUG, 6MWT see objective, NT    2. Halma carry 160 feet, CGA, 5# DB 1st attempt Rt UE carry 3 min, sig improved stopping to recover rather tahn foot catching and pushing through to need assist to recovery 2nd attempt CGA but started laughing at about 60% and became total A x 5 events, could not refocus After sitting rest able to walk to [facility] no LOB, NT    3. Nu step, L3-6 x 10', BLE's only      Therapeutic Exercise Summary: Access Code: [email or web address]  URL: [email or web address]  Date: 05/20/2025  Prepared by: Shraddha Mann    Exercises  - Side Stepping with Resistance at Thighs  - 1 x daily - 7 x weekly - 3 sets - 10 reps  - Bird Dog  - 1 x daily - 7 x weekly - 3 sets - 10 reps  - BOSU® Ball Kneel  - 1 x daily - 7 x weekly - 3 sets - 10 reps  - BOSU® Ball Single Leg Kneel  - 1 x daily - 7 x weekly - 3 sets - 10 reps  - Tall Kneel Vertical Bridge  - 1 x daily - 7 x weekly - 3 sets - 10 reps    Therapeutic Treatments and Modalities:     1. Neuromuscular Re-education (CPT 44046), see below    Therapeutic Treatment and Modalities Summary: NMR: standing balance to improve LE strength, coordination and balance integation.   Standing 1 LE on 4\" step :   Modified tandem hz head turns 10 x 2 , EC x 30sec ea LE  Attempted large heel toe step, unable to maintain EC  BOSU training for ankle & hip strategy:   Mini squats x 10 unsupported int touch down  EC static standing 5-6sec x 10 (int UE touch down, very diff)  Kneeling>half kneeling: R LE ant able to perform min UE support: balanced x30, attempted chop, unable to maintain balance with UE/trunk movement, slow controlled post LOB.  L LE ant very diff, assist to achieve position, Deyanira " at pelvis, diff/unable to maintain      Time-based treatments/modalities:    Physical Therapy Timed Treatment Charges  Neuromusc re-ed, balance, coor, post minutes (CPT 72457): 30 minutes  Therapeutic exercise minutes (CPT 67883): 15 minutes  ASSESSMENT:   Response to treatment:  Gait sig improved no LOB laterally in/out of gym into walls. Demo sig inc LE tolerance and stability with higher level balance challenges.  Cont to discuss psychosocial but during rest.  Encouraging ongoing self efficacy & Practice for interview.    PLAN/RECOMMENDATIONS:   Plan for treatment: therapy treatment to continue next visit.  Planned interventions for next visit: continue with current treatment.

## 2025-06-12 ENCOUNTER — APPOINTMENT (OUTPATIENT)
Dept: PHYSICAL THERAPY | Facility: REHABILITATION | Age: 20
End: 2025-06-12
Attending: STUDENT IN AN ORGANIZED HEALTH CARE EDUCATION/TRAINING PROGRAM
Payer: COMMERCIAL

## 2025-06-12 PROCEDURE — RXMED WILLOW AMBULATORY MEDICATION CHARGE: Performed by: STUDENT IN AN ORGANIZED HEALTH CARE EDUCATION/TRAINING PROGRAM

## 2025-06-13 ENCOUNTER — PHARMACY VISIT (OUTPATIENT)
Dept: PHARMACY | Facility: MEDICAL CENTER | Age: 20
End: 2025-06-13
Payer: COMMERCIAL

## 2025-06-18 ENCOUNTER — PHYSICAL THERAPY (OUTPATIENT)
Dept: PHYSICAL THERAPY | Facility: REHABILITATION | Age: 20
End: 2025-06-18
Attending: STUDENT IN AN ORGANIZED HEALTH CARE EDUCATION/TRAINING PROGRAM
Payer: COMMERCIAL

## 2025-06-18 DIAGNOSIS — G11.4 SPASTIC PARAPLEGIA, HEREDITARY (HCC): ICD-10-CM

## 2025-06-18 DIAGNOSIS — R26.9 GAIT DIFFICULTY: ICD-10-CM

## 2025-06-18 DIAGNOSIS — Z15.89 MONOALLELIC MUTATION OF SPTAN1 GENE: Primary | ICD-10-CM

## 2025-06-18 DIAGNOSIS — R29.898 LEG WEAKNESS, BILATERAL: ICD-10-CM

## 2025-06-18 DIAGNOSIS — Z91.81 FALLS INFREQUENTLY: ICD-10-CM

## 2025-06-18 DIAGNOSIS — G11.9 CEREBELLAR ATAXIA (HCC): ICD-10-CM

## 2025-06-18 PROCEDURE — 97112 NEUROMUSCULAR REEDUCATION: CPT

## 2025-06-18 PROCEDURE — 97110 THERAPEUTIC EXERCISES: CPT

## 2025-06-18 NOTE — OP THERAPY DAILY TREATMENT
"  Outpatient Physical Therapy  DAILY TREATMENT     [facility] Therapy [address].  [address]  [place] [address]  Phone:  [phone]  Fax:  [phone]    Date: 06/18/2025    Patient: Eva Washington  YOB: 2005  MRN: [id number]     Time Calculation    Start time: 1015  Stop time: 1100 Time Calculation (min): 45 minutes         Chief Complaint: Difficulty Walking, Loss Of Balance, and Weakness    Visit #: 28    SUBJECTIVE:   Pt presents reporting seeing PCP.  Concern for kidney infection vs stone.  Neurology on Friday.     Working on HEP, denies falls.     OBJECTIVE:  Current objective measures:   4/17  5STS no UE: 20.81 first trial, fall into chair on 4th rep, 20.49 secs, 11.63 secs 3rd trial  TUG no AFO's: 20.53 secs  6MWT: 376' CGA, pt touched wall once, standing rest breaks from 0:30-0:45, 2:00-2:15, 3:30-3:50    MCCANN  1. Sit to stand  Today 46/56    TUG 16.8 SPV  6MWT: 477 feet, 2 wall touches, able to catch self with catching toe. Mod RPE 2/10.    5/15/25  5STS no UE 12.83  MCCANN  1. Sit to stand  4. Able to stand without using hands and stabilize independently       2. Stand unsupported  4. Able to stand safely for 2 min      3. Sitting with back unsupported  4. Able to sit safely and securely for 2 min    4. Stand to sit  4. Sits safely min use of hands      5. Transfers  4.Able to transfer safely minor use of hands        6. Stand unsupported EC  4      7. Standing unsupported narrow JESUS  4      8. Reaching forward outstretched arm  4, reaching 10-12\"      9.  object from floor in standing  4. Indep       10. Turn to look behind over left and right shoulders  4.    11. Turn 360 degrees  2. Able to turn 360 safely but slowly    12. Place alternating LE on stool while standing unsupported  1. Able to complete >2 steps min A      13. Standing Tandem unsupported  2. Small step, holds unsupported 30sec ea    14. Single limb  Able to lift LE's R LE: 2-3sec, " L LE: 1-2sec     46/56    41-56 low fall risk, 21-40 medium fall risk, 0-20 high fall risk       Therapeutic Exercises (CPT 04042):     1. 5STS, TUG, 6MWT see objective, NT    2. [place]aine carry 160 feet, CGA, 5# DB 1st attempt Rt UE carry 3 min, sig improved stopping to recover rather tahn foot catching and pushing through to need assist to recovery 2nd attempt CGA but started laughing at about 60% and became total A x 5 events, could not refocus After sitting rest able to walk to [facility] no LOB, NT    3. Nu step, L3-7 x 12', BLE's only      Therapeutic Exercise Summary: Access Code: [email or web address]  URL: [email or web address]  Date: 05/20/2025  Prepared by: Shraddha Mann    Exercises  - Side Stepping with Resistance at Thighs  - 1 x daily - 7 x weekly - 3 sets - 10 reps  - Bird Dog  - 1 x daily - 7 x weekly - 3 sets - 10 reps  - BOSU® Ball Kneel  - 1 x daily - 7 x weekly - 3 sets - 10 reps  - BOSU® Ball Single Leg Kneel  - 1 x daily - 7 x weekly - 3 sets - 10 reps  - Tall Kneel Vertical Bridge  - 1 x daily - 7 x weekly - 3 sets - 10 reps    Therapeutic Treatments and Modalities:     1. Neuromuscular Re-education (CPT 74957), see below    Therapeutic Treatment and Modalities Summary: NMR: see PN      Time-based treatments/modalities:    Physical Therapy Timed Treatment Charges  Neuromusc re-ed, balance, coor, post minutes (CPT 39566): 30 minutes  Therapeutic exercise minutes (CPT 23431): 15 minutes    ASSESSMENT:   Response to treatment:  See PN    PLAN/RECOMMENDATIONS:   Plan for treatment: therapy treatment to continue next visit.  Planned interventions for next visit: continue with current treatment.

## 2025-06-18 NOTE — OP THERAPY PROGRESS SUMMARY
Outpatient Physical Therapy  PROGRESS SUMMARY NOTE      [facility] Physical Therapy [place] [address]  [address]  [facility] [address]  Phone:  [phone]  Fax:  [phone]    Date of Visit: 06/18/2025    Patient: Eva Washington  YOB: 2005  MRN: [id number]     Referring Provider: Reed De Los Santos M.D.  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 Gait difficulty  R26.9   4. 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 Falls infrequently  Z91.81   6. Leg weakness, bilateral  R29.898         Rehab Potential: good    Progress Report Period: 5/15/25-6/18/25    Functional Assessment Used      TUG, 6MWT, MCCANN    Objective Findings and Assessment:   Patient progression towards goals: Roxane is working on walking 6min, pushing stroller to [employer].Since last PN 3 visits completed since 5/15/25 met 1/2 STG's, 1/3LTG's, improvement in LE strength, balance and safety with gait. As well as good improvement in CV endurance with 6MWT, however did have 2 LOBs. Functionally, she has also improved in her abilitty to more consistently ambulating without wall touch. She will cont to benefit from skilled PT 1 x 8             Objective findings and assessment details: STS no UE 12.83  TUG 18.7 SPV  6MWT: 412 feet, no wall touches, 2 max A for LOB. Mod RPE 2/10.  MCCANN 37/56    Goals:   Short Term Goals:   1. Pt will be compliant with HEP to improve LE strength and core stability needed for walking without LOB.  -progressing  2. Pt will demonstrate improved functional mobility with TUG score 17 sec or better without LOB.-MET  NEW GOAL:   Pt will demo improvement of Mccann >46 decr risk for falling while CG's for toddler  Short term goal time span:  2-4 weeks      Long Term Goals:    1. Pt will score 41/56 on MCCANN or better demonstrating low fall risk with functional balance activities. -MET  2. Pt will demonstrate ability to ambulate 200' without LOB and no standing rest break to decrease fall risk. -progressing  3. Pt will improve 6MWT by 75' demonstrating improved walking tolerance without physical assistance for LOB.-MET  Long term goal time span:  6-8 weeks    Plan:   Planned therapy interventions:  Neuromuscular Re-education (CPT 35465), Gait Training (CPT 40315), Therapeutic Exercise (CPT 41195) and Therapeutic Activities (CPT 92556)  Frequency:  1x week  Duration in weeks:  8      Referring provider co-signature:  I have reviewed this plan of care and my co-signature certifies the need for services.     Certification Period: 06/18/2025 to 08/13/25    Physician Signature: ________________________________ Date: ______________

## 2025-06-20 ENCOUNTER — OFFICE VISIT (OUTPATIENT)
Dept: NEUROLOGY | Facility: MEDICAL CENTER | Age: 20
End: 2025-06-20
Attending: STUDENT IN AN ORGANIZED HEALTH CARE EDUCATION/TRAINING PROGRAM
Payer: COMMERCIAL

## 2025-06-20 VITALS
OXYGEN SATURATION: 99 % | HEIGHT: 65 IN | TEMPERATURE: 98 F | WEIGHT: 163.8 LBS | HEART RATE: 79 BPM | SYSTOLIC BLOOD PRESSURE: 102 MMHG | DIASTOLIC BLOOD PRESSURE: 52 MMHG | BODY MASS INDEX: 27.29 KG/M2

## 2025-06-20 DIAGNOSIS — E61.1 IRON DEFICIENCY: ICD-10-CM

## 2025-06-20 DIAGNOSIS — G11.9 CEREBELLAR ATAXIA (HCC): ICD-10-CM

## 2025-06-20 DIAGNOSIS — N92.6 IRREGULAR MENSTRUAL CYCLE: ICD-10-CM

## 2025-06-20 DIAGNOSIS — D50.9 IRON DEFICIENCY ANEMIA, UNSPECIFIED IRON DEFICIENCY ANEMIA TYPE: ICD-10-CM

## 2025-06-20 DIAGNOSIS — M62.81 MUSCLE WEAKNESS (GENERALIZED): ICD-10-CM

## 2025-06-20 DIAGNOSIS — G47.00 INSOMNIA, UNSPECIFIED TYPE: ICD-10-CM

## 2025-06-20 DIAGNOSIS — F33.1 MODERATE EPISODE OF RECURRENT MAJOR DEPRESSIVE DISORDER (HCC): ICD-10-CM

## 2025-06-20 DIAGNOSIS — R25.2 SPASTICITY: ICD-10-CM

## 2025-06-20 DIAGNOSIS — R26.9 GAIT DIFFICULTY: ICD-10-CM

## 2025-06-20 DIAGNOSIS — E55.9 VITAMIN D DEFICIENCY: ICD-10-CM

## 2025-06-20 DIAGNOSIS — G11.4 AUTOSOMAL DOMINANT SPASTIC PARAPLEGIA (HCC): ICD-10-CM

## 2025-06-20 DIAGNOSIS — R53.82 CHRONIC FATIGUE: ICD-10-CM

## 2025-06-20 DIAGNOSIS — Z15.89 MONOALLELIC MUTATION OF SPTAN1 GENE: Primary | ICD-10-CM

## 2025-06-20 DIAGNOSIS — F41.8 ANXIETY WITH DEPRESSION: ICD-10-CM

## 2025-06-20 PROCEDURE — 3078F DIAST BP <80 MM HG: CPT | Performed by: STUDENT IN AN ORGANIZED HEALTH CARE EDUCATION/TRAINING PROGRAM

## 2025-06-20 PROCEDURE — 99215 OFFICE O/P EST HI 40 MIN: CPT | Performed by: STUDENT IN AN ORGANIZED HEALTH CARE EDUCATION/TRAINING PROGRAM

## 2025-06-20 PROCEDURE — 3074F SYST BP LT 130 MM HG: CPT | Performed by: STUDENT IN AN ORGANIZED HEALTH CARE EDUCATION/TRAINING PROGRAM

## 2025-06-20 PROCEDURE — 99213 OFFICE O/P EST LOW 20 MIN: CPT | Performed by: STUDENT IN AN ORGANIZED HEALTH CARE EDUCATION/TRAINING PROGRAM

## 2025-06-20 PROCEDURE — G2212 PROLONG OUTPT/OFFICE VIS: HCPCS | Performed by: STUDENT IN AN ORGANIZED HEALTH CARE EDUCATION/TRAINING PROGRAM

## 2025-06-20 RX ORDER — ERGOCALCIFEROL 1.25 MG/1
50000 CAPSULE, LIQUID FILLED ORAL
Qty: 12 CAPSULE | Refills: 3 | Status: SHIPPED | OUTPATIENT
Start: 2025-06-20 | End: 2026-06-15

## 2025-06-20 ASSESSMENT — FIBROSIS 4 INDEX: FIB4 SCORE: 0.26

## 2025-06-20 ASSESSMENT — PATIENT HEALTH QUESTIONNAIRE - PHQ9
SUM OF ALL RESPONSES TO PHQ QUESTIONS 1-9: 21
CLINICAL INTERPRETATION OF PHQ2 SCORE: 6
5. POOR APPETITE OR OVEREATING: 3 - NEARLY EVERY DAY

## 2025-06-21 NOTE — PROGRESS NOTES
NEUROLOGY FOLLOW-UP - 06/20/2025     REASON FOR VISIT: Eva Washington 19 y.o. 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   Low normal left ventricular systolic function.  Visually estimated ejection fraction is 50-55 %.  Normal right ventricular size and systolic function.  No significant valvular abnormalities.      MRI Right knee Aug 2024 (Done at [facility]) - no bony abnormality and no internal ligament derangement         PFTs June 2024:                 INTERVAL HISTORY:  Patient continue to work hard and make gains with PT/OT. PT has noticed her gains each visit, especially her strength.    Patient met with [facility] team which she found very helpful. The team will help facilitate  connecting Eva with other patient's across the world with similar genetic disease. Potential treatment options were also discussed during the visit as well.    Labs from the last 6 months reviewed. Notable labs include vitamin D deficiency and iron deficiency/borderline low normal iron/ferritin.    Patient reports low energy levels, feeling chronically fatigued and sleepy during the day. She will at times require a nap.    She reports feeling depressed and anxious at times. She suffers from insomnia (sleep onset and sleep maintenance  insomnia)        6/20/2025     4:20 PM 3/19/2025     9:40 AM 12/30/2024     3:00 PM 3/20/2024    10:00 AM 12/13/2023     7:20 AM   PHQ-9 Screening   Little interest or pleasure in doing things 3 - nearly every day 2 - more than half the days 3 - nearly every day 0 - not at all 0 - not at all   Feeling down, depressed, or hopeless 3 - nearly every day 3 - nearly every day 3 - nearly every day 0 - not at all 0 - not at all   Trouble falling or staying asleep, or sleeping too much 3 - nearly every day 3 - nearly every day 3 - nearly every day     Feeling tired or having little energy 2 - more than half the days 2 - more than half the days 3 - nearly every day     Poor appetite or overeating 3 - nearly every day 3 - nearly every day 3 - nearly every day     Feeling bad about yourself - or that you are a failure or have let yourself or your family down 2 - more than half the days 1 - several days 2 - more than half the days     Trouble concentrating on things, such as reading the newspaper or watching television 3 - nearly every day 1 - several days 1 - several days     Moving or speaking so slowly that other people could have noticed. Or the opposite - being so fidgety or restless that you have been moving around a lot more than usual 2 - more than half the days 2 - more than half the days 2 - more than half the days     Thoughts that you would be better off dead, or of hurting yourself in some way 0 - not at all 0 - not at all 1 - several days     PHQ-2 Total Score 6 5 6 0 0   PHQ-9 Total Score 21 17 21         Interpretation of PHQ-9 Total Score   Score Severity   1-4 No Depression   5-9 Mild Depression   10-14 Moderate Depression   15-19 Moderately Severe Depression   20-27 Severe Depression      CURRENT MEDICATIONS:  Medications Ordered Prior to Encounter[1]     EXAM:   /52 (BP Location: Left arm, Patient Position: Sitting, BP Cuff Size: Adult)   Pulse 79   Temp 36.7 °C (98 °F) (Temporal)   Ht 1.651 m (5'  "5\")   Wt 74.3 kg (163 lb 12.8 oz)   SpO2 99%    Wt Readings from Last 5 Encounters:   06/20/25 74.3 kg (163 lb 12.8 oz) (89%, Z= 1.25)*   06/05/25 73 kg (161 lb) (88%, Z= 1.18)*   05/19/25 74.4 kg (164 lb) (90%, Z= 1.26)*   03/19/25 69.9 kg (154 lb) (84%, Z= 1.01)*   12/31/24 69.9 kg (154 lb) (85%, Z= 1.03)*     * Growth percentiles are based on [facility] (Girls, 2-20 Years) data.      Physical Exam:  Physical Exam  Eyes:      Extraocular Movements: EOM normal. No nystagmus.   Neurological:      Coordination: Romberg sign negative.      Deep Tendon Reflexes:      Reflex Scores:       Tricep reflexes are 3+ on the right side and 3+ on the left side.       Bicep reflexes are 3+ on the right side and 3+ on the left side.       Brachioradialis reflexes are 3+ on the right side and 3+ on the left side.       Patellar reflexes are 4+ on the right side and 4+ on the left side.       Achilles reflexes are 4+ on the right side and 4+ on the left side.  Psychiatric:         Speech: Speech normal.        Neurological Exam   Neurological Exam  Mental Status  Awake, alert and oriented to person, place and time. Recent and remote memory are intact. Speech is normal. Language is fluent with no aphasia. Attention and concentration are normal.    Cranial Nerves  CN III, IV, VI: Extraocular movements intact bilaterally. No nystagmus. Normal saccades. Normal smooth pursuit.  CN V: Facial sensation is normal.  CN VII: Full and symmetric facial movement.    Motor  Normal muscle bulk throughout. No fasciculations present. Increased muscle tone. No pronator drift.  Increased tone in b/l LE. Very mild 4+/5 in the b/l LE. Strength intact in the UE..    Sensory  Light touch is normal in upper and lower extremities. Temperature is normal in upper and lower extremities. Vibration is normal in upper and lower extremities.   Inconsistent left arm numbness.    Reflexes                                            Right                      " Left  Brachioradialis                    3+                         3+  Biceps                                 3+                         3+  Triceps                                3+                         3+  Patellar                                4+                         4+  Achilles                                4+                         4+  Right Plantar: equivocal  Left Plantar: equivocal    Right pathological reflexes: Ofelia's present. Suprapatellar present. Crossed adductor present. Ankle clonus present.  Left pathological reflexes: Ofelia's present. Suprapatellar present. Crossed adductor present. Ankle clonus present.    Coordination  Right: Finger-to-nose normal. Heel-to-shin normal.Left: Finger-to-nose normal. Heel-to-shin normal.    Gait  Casual gait: Reduced stride length. Shuffling, spastic and antalgic gait. Toe walking abnormality: Heel walking abnormality: Romberg is absent.  Left>right out-toeing. B/l foot dragging owing to poor ROM, flexibility, and mild weakness of hip flexion and ankle dorsi-flexion. Gait is more balanced and less ataxic compared to my initial exam .       ASSESSMENT, EDUCATION, AND COUNSELING:  This is a 19 y.o. female patient who presents to the neurology clinic. We had an extensive discussion about the patient's symptoms, signs, and work-up to date, if any. We discussed potential and/or definitive diagnoses, work-up, and potential treatments.     PLAN:  Medications administered in today's encounter if applicable:       If applicable, the work-up such as labs, imaging, procedures, and/or other testing, referrals, and/or recommended treatment strategies are listed below.  Orders Placed This Encounter    CBC WITH DIFFERENTIAL    Comp Metabolic Panel    VITAMIN C SERUM    VIT B12,  FOLIC ACID    METHYLMALONIC ACID, SERUM    HOMOCYSTEINE    TSH WITH REFLEX TO FT4    VITAMIN D,25 HYDROXY (DEFICIENCY)    IRON/TOTAL IRON BIND    FERRITIN    TRANSFERRIN SATURATION     TRANSFERRIN    Patient has been identified as having a positive depression screening. Appropriate orders and counseling have been given.    vitamin D2 (ERGOCALCIFEROL) 1.25 mg (73115 Units) Cap capsule     Lab Frequency Next Occurrence         Medication List            Accurate as of June 20, 2025  5:48 PM. If you have any questions, ask your nurse or doctor.                START taking these medications        Instructions   vitamin D2 1.25 mg (77588 Units) Caps capsule  Commonly known as: Ergocalciferol  Started by: Dr. Reed De Los Santos   Take 1 Capsule by mouth every 7 days for 360 days.  Dose: 50,000 Units            CONTINUE taking these medications        Instructions   albuterol 108 (90 Base) MCG/ACT Aers inhalation aerosol   Doctor's comments: Give albuterol that is patient or insurance preference please  Inhale 2 Puffs every four hours as needed for Shortness of Breath.  Dose: 2 Puff     budesonide-formoterol 80-4.5 MCG/ACT Aero  Commonly known as: Symbicort   Inhale 2 Puffs 2 times a day. Use with spacer.  Rinse mouth after each use.  Dose: 2 Puff     citalopram 20 MG Tabs  Commonly known as: CeleXA   Take 20 mg by mouth every day.  Dose: 20 mg     hydrOXYzine pamoate 50 MG Caps  Commonly known as: Vistaril   Take 50 mg by mouth 4 times a day as needed for Anxiety.  Dose: 50 mg     IRON PO   Take 1 Tablet by mouth every day.  Dose: 1 Tablet     lamoTRIgine 25 MG Tabs  Commonly known as: LaMICtal   Take 50 mg by mouth every day.  Dose: 50 mg     omeprazole 40 MG delayed-release capsule  Commonly known as: PriLOSEC   Take 40 mg by mouth every day.   Dose: 40 mg     traZODone 100 MG Tabs  Commonly known as: Desyrel   Take 100 mg by mouth every evening.  Dose: 100 mg     Ubrelvy 100 MG Tabs  Generic drug: Ubrogepant   Take 100 mg by mouth as needed (take 100 mg at onset of migraine.  May repeat dose in 2 hours if unrelieved.  Not to exceed 2 tablets in 24 hours.) for up to 30 days.  Dose: 100 mg     zolpidem 5 MG  Tabs  Commonly known as: Ambien   Take 5 mg by mouth at bedtime as needed for Sleep.  Dose: 5 mg            STOP taking these medications      carBAMazepine  MG Tb12  Commonly known as: TEGretol XR  Stopped by: Dr. Reed De Los Santos     gabapentin 300 MG Caps  Commonly known as: Neurontin  Stopped by: Dr. Reed De Los Santos     ondansetron 8 MG Tbdp  Commonly known as: Zofran ODT  Stopped by: Dr. Reed De Los Santos     OptiChamber Latesha Misc  Stopped by: Dr. Reed De Los Santos     sulfamethoxazole-trimethoprim 800-160 MG tablet  Commonly known as: Bactrim DS  Stopped by: Dr. 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 Patient continues to make gains in terms of gait, mobility, and strength, balance with working with PT?OT. I recommend that patient continue PT/OT per their recommendations. Patient to follow-up with [facility] Genetic team per their recommendations as well. Patient to connect with a community with similar disease and may pursue treatments (i.e.. bone marrow transplant)    Vitamin D deficiency - Recommend 50,000 IU/week of vitamin D3. Script sent to pharmacy. Will plan to obtian vitamin D levels in 6 months    Chronic fatigue; iron deficiency/borderline low normal iron/ferritin - Recommend labs as above including iron studies. Recommend ferrous sulfate 325 mg per day. Will plan to repeat iron studies in 4-6 months    Anxiety and depression - Patient to follow-up with her psychiatrist. Consider SNRI (Effexor, Pristiq) and/or Mirtazapine (in place of trazodone potentially). Will defer management of these condition to her psychiatrist.    Follow-up in 6 months      BILLING DOCUMENTATION:     The number of minutes of face-to-face time spent in this encounter was I spent a total of 55 minutes on the day of the visit.  . Over 50% of the time of the visit today was spent on counseling and/or coordination of care wtih the patient  and/or family, as outlined above in assessment in plan.    Reed De Los Santos MD  Department of Neurology at [facility]  Diplomate of the American Board of Psychiatry and Neurology, General Neurology  Diplomate of American Board of Psychiatry and Neurology, a Member Board of the American Board of Medical Subspecialties, Epilepsy  Director of [facility] Level III Comprehensive Epilepsy Program  Professor of Clinical Neurology, [facility].   [address], [address]  [facility] [id number] [phone]   Fax: [phone]  E-mail: [email or web address]         [1]   Current Outpatient Medications on File Prior to Visit   Medication Sig Dispense Refill    budesonide-formoterol (SYMBICORT) 80-4.5 MCG/ACT Aerosol Inhale 2 Puffs 2 times a day. Use with spacer.  Rinse mouth after each use. 1 Each 11    albuterol 108 (90 Base) MCG/ACT Aero Soln inhalation aerosol Inhale 2 Puffs every four hours as needed for Shortness of Breath. 1 Each 6    Ubrogepant (UBRELVY) 100 MG Tab Take 100 mg by mouth as needed (take 100 mg at onset of migraine.  May repeat dose in 2 hours if unrelieved.  Not to exceed 2 tablets in 24 hours.) for up to 30 days. 10 Tablet 5    lamoTRIgine (LAMICTAL) 25 MG Tab Take 50 mg by mouth every day.      citalopram (CELEXA) 20 MG Tab Take 20 mg by mouth every day.      zolpidem (AMBIEN) 5 MG Tab Take 5 mg by mouth at bedtime as needed for Sleep.      traZODone (DESYREL) 100 MG Tab Take 100 mg by mouth every evening.      omeprazole (PRILOSEC) 40 MG delayed-release capsule Take 40 mg by mouth every day.         hydrOXYzine pamoate (VISTARIL) 50 MG Cap Take 50 mg by mouth 4 times a day as needed for Anxiety.      Ferrous Sulfate (IRON PO) Take 1 Tablet by mouth every day. (Patient not taking: Reported on 6/20/2025)       No current facility-administered medications on file prior to visit.

## 2025-06-21 NOTE — PATIENT INSTRUCTIONS
NEUROLOGY CLINIC VISIT WITH DR. DE LOS SANTOS     PLEASE READ THIS ENTIRE DOCUMENT CAREFULLY AND COMPLETELY:    First and foremost, you matter to Dr. De Los Santos and you deserve the best care.   Dr. De Los Santos prides himself on providing the best possible care to all his patients. He strives to make each appointment meaningful, so that all your concerns are being addressed and all your neurological problems are being optimally treated. In order to achieve these goals for everyone, Dr. De Los Santos has listed important reminders and the best ways to prepare for each appointment. Please read each item carefully. Thank you!    Due to the high volume of patients we are trying to help, your physician will not be able to respond by phone or in [employer] to your routine concerns between appointments.  This does not reflect a lack of interest or concern for you or your diagnosis.  Please bring these questions and concerns to your appointment where your physician can answer.  Please relay more pressing concerns to our office, either via [employer], or by phone; if not able to reach us please visit nearby Urgent Care Center or Emergency Department.  If any emergent medical needs, please seek emergent medical help and/or call 911.    Also, please note that we are not able to fill out paperwork that might be related to your work, utility company, disability, and/or driving, among others, in between the visits.  Please schedule a dedicated appointment to address any and all paperwork.  This is not due to lack of concern or interest for your disease-related work/administrative problems, but to make sure that we provide the best possible care and to fill out your paperwork in a correct, complete, and timely manner.  ------------------------------------------------------------------------------------------  Please let our office know if you have any changes in your seizure frequency and/characteristics.     Please keep a diary of your seizures and bring it  with you to each appointment.    Please take vitamin D3 [id number] internation units daily.     Please abstain from driving until further notice    If you are a biological female with epilepsy who is of reproductive age, who is actively breastfeeding, and/or who infants/young children:  Please take folic acid 1 mg daily. This is an over-the-counter supplement that is recommended to prevent certain developmental problems in your baby, in case you become pregnant in the future.  It is critical that you let our office know as soon as you become pregnant or plan to become pregnant.  If you are caring for a baby/young child, please make sure to be sitting on a soft surface while holding your baby/young child, so in case you have a seizure, your baby/young child is not injured due to fall.   Please let us know if, while breastfeeding, you observe that your baby is excessively sleepy and/or has other behavioral changes. Because many antiseizure medications are collected in breast milk, some nursing babies can suffer adverse medication effects.    Please note that the following might precipitate seizures:   missed doses of antiseizure medications  being sick with a fever, stress  Fatigue  sleep deprivation or abnormal sleeping patterns  not eating regularly  not drinking enough water  drinking too much alcohol  stopping alcohol suddenly if you are currently using it on a regular/daily basis,   using recreational drugs, among others.    Please note that the following might lead to an injury or even be life-threatening in the event you have a seizure and/or lose awareness while:  being in a large body of water by yourself, such as bath, pool, lake, ocean, among others (risk of drowning)  being on unprotected heights (risk of fall)  being around and/or operating heavy machinery (risk of injury)  being around open fire/hot surfaces (risk of burns)  any other activities/circumstances, in which if you lose awareness, you might  injure yourself and/or others.  -------------------------------------------------------------------------------------------  SUDEP (SUDDEN UNEXPECTED DEATH IN EPILEPSY)  It is important that your seizures are well controlled and you have none or have them rarely. In addition to avoiding injury related to breakthrough seizures, frequent seizures increase risk of SUDEP (sudden unexpected death in epilepsy), where a person goes into a seizure and then never wakes up. The best way to prevent SUDEP is to control your seizures well.   ------------------------------------------------------------------------------------------  Please call for help (crisis line and/or 911) in case you have thoughts of harming yourself and/or others.  ------------------------------------------------------------------------------------------  INSTRUCTIONS FOR YOUR FAMILY/CAREGIVERS:  Please call 911 if the patient has a seizure longer than 2-3 minutes, if seizures are back to back without her recovering to her baseline, or she does not start recovering within 5-10 minutes after the seizure stops. During the seizure - please turn her on her side, please make sure her head is protected (for example, you should put a pillow under her head, if one is available), and please do not put anything in her mouth.   ------------------------------------------------------------------------------------------  PATIENT EXPECTATIONS,  IMPORTANT APPOINTMENT REMINDERS, AND ADDITIONAL HELPFUL TIPS:   REFILLS:   Request refills AT LEAST 1 week in advance to ensure you do not run out of medications    MyChart  It is STRONGLY encouraged that ALL patients sign up for MyChart. It is BY FAR the fastest and most convenient way for both Dr. De Los Santos and patients to obtain timely refills.  If you are having trouble signing up or logging into your account, staff are available to help you. Please ask a medical assistant or staff at the  to assist you.    TEST RESULS:    All labs and diagnostic test results will be reviewed at your next visit, UNLESS  Dr. De Los Santos determines that there are important findings on the tests need to be acted on sooner. Dr. De Los Santos will either call or send a message through [employer] if this is the case.    BE PREPARED PRIOR TO EVERY APPOINTMENT:  All patient are responsible for ensuring that ALL test results that were completed outside of the [facility] system have been received by our Neurology Department PRIOR to your appointment with Dr. De Los aSntos.    IMPORTANT:  ALL images (not just the reports) must be sent and uploaded to the [facility] system. Dr. De Los Santos reviews all images personally prior to each visit. Ensuring that ALL the test results and test images are accessible to Dr. De Los Santos prior to your appointment is YOUR responsibility and an important part of making the most out of each appointment.   Bring a government-issues picture ID and an updated insurance card EVERY visit.  It is highly recommended that you bring at every visit a list of the most important topics that you want address. While it may not be possible to address all items on the list in a single visit, preparing a list will ensure that Dr. De Los Santos addresses the items that are most important to you and your health    PAPERWORK, DOCUMENTATION, LETTER REQUESTS:  You must notify the office ahead of your appointment of all paperwork or letter requests.   Please DO NOT wait until the last minute to make these requests. Please give all paperwork to the medical assistant at the start of the appointment and check-in process. Please note that Dr. De Los Santos may not be able complete some types of documentation in a single appointment or even within a single day or week. This is why it is important to communicate paperwork requests prior to your appointment and at least 2 weeks prior to any deadlines.    KNOW ALL YOU MEDICATIONS:   AT EVERY SINGLE APPOINTMENT, please bring a list of every single prescribed,  non-prescribed, and over the counter medication or supplements you are taking, including ones taken on a rare or intermittent basis.  Include the following information for each prescribed or non-prescribed medications:  Name of medication   The strength of EACH pill/capsule/tablet, etc.   The number of pills/capsules/tablets, etc taken per dose  The number and time of day that doses are taken  For every single Supplement that you take on a routine or intermittent basis, you must include:  The Brand Name   A complete list of every single ingredient, compound, vitamin, and/or mineral in each dose, along with the corresponding amounts/strengths of all ingredients, vitamins, minerals, etc., if such information is provided or known  The number of doses taken per day and time of day doses are taken  If medications are taken on an intermittent or as needed basis, please estimate how many days per week or days per month the medications are used  DO NOT just print out your medication list from [employer] or bring a list from a prior appointment or hospitalizations because the information is often often unreliable, inaccurate, outdated, and/or incomplete   The list should be printed or written  If you forget or do not have a list of all the medication, then it is acceptable, although less preferred, to bring all the bottles to the appointment     ARRIVE EARLY FOR ALL VISITS:  Please note that we are unable to accommodate late arrivals as per office policy.  YOU-the patient - (NOT a parent, spouse, or friend) must be physically present at check-in no later than 12 minutes after the scheduled appointment time, or you will be asked to reschedule   Consider scheduling a virtual appointment with Dr. De Los Santos through [employer] as an alternative if transportation to the clinic is difficult or unavailable   Please note, however, that virtual visits can only be scheduled after being an established patient of Dr. De Los Santos. All new appointments  "must be done in-person in clinic  Some insurances will not cover the cost of virtual appointments. Please check with your insurance to find out if these visits are covered    COMMUNICATING URGENT AND NON-URGENT MATTERS:  Your concerns are important and deserve to be heard and addressed. If you have an urgent matter, there are two methods that will ensure your concerns are prioritized appropriately:   Preferred method: Sign-up/Login to your [employer] account and send a message addressed to Dr. De Los Santos or Claudette Ramesh (Dr. De Los Santos's assistant). In the subject line, type \"urgent\" followed by a word or phrase describing the situation (For example, write \"Urgent: Out of antiseuzre med and need refill\" or \"Urgent: Severe side effects to new meds\". In doing this, our staff can ensure urgent messages are triaged appropriately and communicated to Dr. De Los Santos that day.  Call [facility] main line at [phone]. Dr. De Los Santos's voicemail extension is [phone]. When leaving a voice message, specifically indicate if it is urgent (or non-urgent) so that the matter can be triaged appropriately and addressed in a timely manner    Thank you for entrusting your neurological care to [facility] and we look forward to continuing to serve you.   "

## 2025-06-24 ENCOUNTER — APPOINTMENT (OUTPATIENT)
Dept: PHYSICAL THERAPY | Facility: REHABILITATION | Age: 20
End: 2025-06-24
Attending: STUDENT IN AN ORGANIZED HEALTH CARE EDUCATION/TRAINING PROGRAM
Payer: COMMERCIAL

## 2025-06-25 ENCOUNTER — PHYSICAL THERAPY (OUTPATIENT)
Dept: PHYSICAL THERAPY | Facility: REHABILITATION | Age: 20
End: 2025-06-25
Attending: STUDENT IN AN ORGANIZED HEALTH CARE EDUCATION/TRAINING PROGRAM
Payer: COMMERCIAL

## 2025-06-25 DIAGNOSIS — Z91.81 FALLS INFREQUENTLY: ICD-10-CM

## 2025-06-25 DIAGNOSIS — Z15.89 MONOALLELIC MUTATION OF SPTAN1 GENE: Primary | ICD-10-CM

## 2025-06-25 DIAGNOSIS — G11.4 SPASTIC PARAPLEGIA, HEREDITARY (HCC): ICD-10-CM

## 2025-06-25 DIAGNOSIS — R26.9 GAIT DIFFICULTY: ICD-10-CM

## 2025-06-25 DIAGNOSIS — G11.9 CEREBELLAR ATAXIA (HCC): ICD-10-CM

## 2025-06-25 DIAGNOSIS — R29.898 LEG WEAKNESS, BILATERAL: ICD-10-CM

## 2025-06-25 PROCEDURE — 97110 THERAPEUTIC EXERCISES: CPT

## 2025-06-25 PROCEDURE — 97112 NEUROMUSCULAR REEDUCATION: CPT

## 2025-06-25 NOTE — OP THERAPY DAILY TREATMENT
"  Outpatient Physical Therapy  DAILY TREATMENT     [facility] [address]  [address]  [place] [address]  Phone:  [phone]  Fax:  [phone]    Date: 06/25/2025    Patient: Eva Washington  YOB: 2005  MRN: [id number]     Time Calculation    Start time: 1447  Stop time: 1530 Time Calculation (min): 43 minutes         Chief Complaint: Difficulty Walking, Loss Of Balance, and Weakness    Visit #: 29    SUBJECTIVE:   Pt presents reporting seeing PCP.  Concern for kidney infection vs stone.  Neurology on Friday.     Working on HEP, denies falls.     OBJECTIVE:  Current objective measures:   4/17  5STS no UE: 20.81 first trial, fall into chair on 4th rep, 20.49 secs, 11.63 secs 3rd trial  TUG no AFO's: 20.53 secs  6MWT: 376' CGA, pt touched wall once, standing rest breaks from 0:30-0:45, 2:00-2:15, 3:30-3:50    MCCANN  1. Sit to stand  Today 46/56    TUG 16.8 SPV  6MWT: 477 feet, 2 wall touches, able to catch self with catching toe. Mod RPE 2/10.    5/15/25  5STS no UE 12.83  MCCANN  1. Sit to stand  4. Able to stand without using hands and stabilize independently       2. Stand unsupported  4. Able to stand safely for 2 min      3. Sitting with back unsupported  4. Able to sit safely and securely for 2 min    4. Stand to sit  4. Sits safely min use of hands      5. Transfers  4.Able to transfer safely minor use of hands        6. Stand unsupported EC  4      7. Standing unsupported narrow JESUS  4      8. Reaching forward outstretched arm  4, reaching 10-12\"      9.  object from floor in standing  4. Indep       10. Turn to look behind over left and right shoulders  4.    11. Turn 360 degrees  2. Able to turn 360 safely but slowly    12. Place alternating LE on stool while standing unsupported  1. Able to complete >2 steps min A      13. Standing Tandem unsupported  2. Small step, holds unsupported 30sec ea    14. Single limb  Able to lift LE's R LE: 2-3sec, " L LE: 1-2sec     46/56    41-56 low fall risk, 21-40 medium fall risk, 0-20 high fall risk       Therapeutic Exercises (CPT 14102):     1. 5STS, TUG, 6MWT see objective, NT    2. Hutto carry 160 feet, CGA, 5# DB 1st attempt Rt UE carry 3 min, sig improved stopping to recover rather tahn foot catching and pushing through to need assist to recovery 2nd attempt CGA but started laughing at about 60% and became total A x 5 events, could not refocus After sitting rest able to walk to lobby no LOB, NT    3. Nu step, L5-8 x 10', BLE's only    4. Bridging 2 LE's, x10    5. Single L LE bridge, x50sec, x1min, x30sec, HEP    6. single  RLE bridge, x55sec, x60sec,    7. Sidelying hip ABd, x5 ea, HEP    8. Hip Ext PROM, WFL    9. BUtterfly stretch, 2min, HEP      Therapeutic Exercise Summary: Access Code: [email or web address]  URL: [email or web address]  Date: 05/20/2025  Prepared by: Shraddha Mann    Exercises  - Side Stepping with Resistance at Thighs  - 1 x daily - 7 x weekly - 3 sets - 10 reps  - Bird Dog  - 1 x daily - 7 x weekly - 3 sets - 10 reps  - BOSU® Ball Kneel  - 1 x daily - 7 x weekly - 3 sets - 10 reps  - BOSU® Ball Single Leg Kneel  - 1 x daily - 7 x weekly - 3 sets - 10 reps  - Tall Kneel Vertical Bridge  - 1 x daily - 7 x weekly - 3 sets - 10 reps    Therapeutic Treatments and Modalities:     1. Neuromuscular Re-education (CPT 02327), see below    Therapeutic Treatment and Modalities Summary: NMR: Edu on self care, maintaining focus on progress, not limitations, positive reinforcement toward working and successful job interview.    LE spasticity discussion around PFM and hip control relating to inc in UTI's.  Reinforced stretching hips ADD, belly breathing, focus on emptying bladder.    Time-based treatments/modalities:    Physical Therapy Timed Treatment Charges  Neuromusc re-ed, balance, coor, post minutes (CPT 05747): 10 minutes  Therapeutic exercise minutes (CPT 63944): 33 minutes    ASSESSMENT:   Response to  treatment:  Demo global strengthening and improvements in walking quality and balance.  Cont to have fluctuations based on mental health and psychosocial factors with living/CGing at home.    PLAN/RECOMMENDATIONS:   Plan for treatment: therapy treatment to continue next visit.  Planned interventions for next visit: continue with current treatment.

## 2025-06-25 NOTE — PROGRESS NOTES
Telemedicine Video Visit: Established Patient   This visit was conducted via Zoom using secure and encrypted videoconferencing technology. The patient was in a private location in the state of [place]. The patient's identity was confirmed and verbal consent was obtained for this virtual visit. Given the importance of social distancing and other strategies recommended to reduce the risk of COVID-19 transmission, I am providing medical care to this patient via audio/video visit in place of an in person visit at the request of the patient. Verbal consent to telehealth, risks, benefits, and consequences were discussed. Patient retains the right to withdraw at any time. All existing confidentiality protections apply. The patient has access to all transmitted medical information. No dissemination of any patient images or information to other entities without further written consent.    Place of Service: Home    Subjective:       Date of Visit: 6/26/2025     Chief Complaint:  Chief Complaint   Patient presents with    Follow-Up     Last seen 5/19/25 w/ Dr. Brambila for Monoallelic mutation of SPTAN1 gene       Results     [place] 6/5/25     HPI:   Eva Washington is a very pleasant 19 y.o. 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 Last seen in the office on 5/19/2025 with Dr. Brambila.     Patient is followed by pulmonary office for asthma.  PFTs in 2025 show no significant obstruction on spirometry but did show positive bronchodilator response and air trapping, suggested of RAD with a FEV1 3.21 L or 93%, FEV1/FVC 81%.  Lung volumes have also remained stable compared to 2024.  No recent imaging.  Triggers for symptoms include prolonged walking, cleaning, certain smells, and seasonal allergies.  Patient was started on Symbicort 80 and albuterol as needed.    Interval  events:  6/26/2025-  Patient states that she will have occasions of increased shortness of breath, occasional cough with rare sputum.  She noticed that the last episode was triggered by cleaning solutions.  She continues to use benefit from Symbicort.  She will use albuterol couple times a day.  Her pulse oximeter at home is 99% on room air.    Exacerbations this year:  0    Current medication regimen:  Symbicort 80 and albuterol    Oxygen use:  None       Past Medical History[1]  Past Surgical History[2]  Social History     Socioeconomic History    Marital status: Single     Spouse name: Not on file    Number of children: Not on file    Years of education: Not on file    Highest education level: Not on file   Occupational History    Not on file   Tobacco Use    Smoking status: Never    Smokeless tobacco: Never   Vaping Use    Vaping status: Never Used   Substance and Sexual Activity    Alcohol use: No    Drug use: No    Sexual activity: Not Currently   Other Topics Concern    Behavioral problems Not Asked    Interpersonal relationships Not Asked    Sad or not enjoying activities Not Asked    Suicidal thoughts Not Asked    Poor school performance Not Asked    Reading difficulties Not Asked    Speech difficulties Not Asked    Writing difficulties Not Asked    Inadequate sleep Not Asked    Excessive TV viewing Not Asked    Excessive video game use Not Asked    Inadequate exercise Not Asked    Sports related Not Asked    Poor diet Not Asked    Family concerns for drug/alcohol abuse Not Asked    Poor oral hygiene Not Asked    Bike safety Not Asked    Family concerns vehicle safety Not Asked   Social History Narrative    Not on file     Social Drivers of Health     Financial Resource Strain: Not on file   Food Insecurity: Not on file   Transportation Needs: Not on file   Physical Activity: Not on file   Stress: Not on file   Social Connections: Not on file   Intimate Partner Violence: Not on file   Housing Stability: Not  "on file        Family History   Problem Relation Age of Onset    Asthma Mother     Fibromyalgia Mother     Hypertension Mother     Hyperlipidemia Mother     Autoimmune Disease Mother     Thyroid Mother     Seizures Father     Stroke Father     Hyperlipidemia Father     Hypertension Father     Atrial fibrillation Father     Bipolar disorder Sister     Depression Sister     Seizures Sister     Neuromuscular disorder Brother     Cancer Maternal Uncle     Cancer Maternal Uncle     Diabetes Maternal Grandmother     Heart Disease Maternal Grandmother     Heart Failure Maternal Grandmother     COPD Maternal Grandmother     Cancer Maternal Grandfather     Diabetes Paternal Grandmother      Medications Ordered Prior to Encounter[3]  Allergies: Tegretol [carbamazepine] and Tree nuts food allergy    ROS:   Review of Systems   Constitutional:  Negative for chills, diaphoresis, fever and malaise/fatigue.   HENT:  Negative for congestion and sinus pain.    Respiratory:  Positive for cough and shortness of breath. Negative for hemoptysis, sputum production and wheezing.    Cardiovascular:  Negative for chest pain, palpitations and leg swelling.   Gastrointestinal:  Negative for diarrhea, heartburn, nausea and vomiting.   Musculoskeletal:  Negative for falls and myalgias.   Neurological:  Negative for dizziness, weakness and headaches.     Vitals:  Ht 1.651 m (5' 5\")   Wt 66.7 kg (147 lb)     Physical Exam  Constitutional:       General: She is not in acute distress.     Appearance: Normal appearance. She is not ill-appearing, toxic-appearing or diaphoretic.   HENT:      Head: Normocephalic and atraumatic.   Eyes:      Conjunctiva/sclera: Conjunctivae normal.   Pulmonary:      Effort: Pulmonary effort is normal.   Skin:     General: Skin is warm.      Coloration: Skin is not jaundiced or pale.      Findings: No erythema.   Neurological:      General: No focal deficit present.      Mental Status: She is alert and oriented to " person, place, and time. Mental status is at baseline.   Psychiatric:         Mood and Affect: Mood normal.         Behavior: Behavior normal.         Thought Content: Thought content normal.         Judgment: Judgment normal.         Laboratory Data:  PFTs: (Date: 6/5/2025)-    Impression:  There is no significant obstructive ventilatory defect on spirometry.  There is a partial bronchodilator response, most pronounced in the mid flow rates.  In the absence of spirometric evidence of obstruction, use symptom response to determine if the use of short acting bronchodilators is clinically appropriate.    Flow-volume loop is consistent with the above interpretation.    Compared to prior testing in 2024, the FEV1 has remained stable at 3.43 L, or 99% predicted.      Assessment and Plan:    Problem List Items Addressed This Visit       Mild intermittent asthma without complication    PFTs in 2025 show no significant obstruction on spirometry but did show positive bronchodilator response and air trapping, suggested of RAD.  Lung volumes have also remained stable compared to 2024.  Symptoms have not significantly improved.  No exacerbations.   - Increase Symbicort to 160 from 80  - Continue albuterol as needed  - Advised patient to remain active  - Advised patient to remain up to date on all vaccines         BMI 24.0-24.9, adult - Primary    Monoallelic mutation of SPTAN1 gene    Does not appear to have pulmonary symptoms associated with most variants.           Diagnostic studies have been reviewed with the patient.    No follow-ups on file.     This note was generated using voice recognition software which has a chance of producing errors of grammar and possibly content.  I have made every reasonable attempt to find and correct any obvious errors, but it should be expected that some may not be found prior to finalization of this note.    Time spent in record review prior to patient arrival, reviewing results, and in  "face-to-face encounter totaled 29 min.  __________  ROSENDA Walters  Pulmonary Medicine  [facility]          [1]   Past Medical History:  Diagnosis Date    Anxiety     Asthma     being worked up    Cough     Dental disorder     BRACES ON BOTTOM; WEARS RETAINER    Depression     Heart murmur     pah (PULMONARY ARTIERAL HYPERTENSION) CARDIOLOGY CLEARED    Hemorrhagic disorder (HCC)     \"bleeds easily\"-not officially diagnosed    Migraines     Muscle disorder     Psychiatric problem     Anxiety    Pulmonary artery hypertension (HCC)     now being followed by cardiologist    Seizure ([facility])     Febrile seizure at 13 months old.    Shortness of breath     Wheezing    [2]   Past Surgical History:  Procedure Laterality Date    FUSION AND ARTHROEREISIS, JOINT, FOOT AND ANKLE Right 12/10/2024    Procedure: RIGHT GASTROC SOLEUS RECESSION, GREAT METATARSALPHALANGEAL FUSION WITH CALCANEAL GRAFT;  Surgeon: Moshe Kaufman M.D.;  Location: SURGERY [facility];  Service: Orthopedics    BONE GRAFT Right 12/10/2024    Procedure: BONE GRAFT;  Surgeon: Moshe Kaufman M.D.;  Location: SURGERY [facility];  Service: Orthopedics    TENDON LENGHTENING Right 12/10/2024    Procedure: LENGTHENING, TENDON;  Surgeon: Moshe Kaufman M.D.;  Location: SURGERY [facility];  Service: Orthopedics    UMBILICAL HERNIA REPAIR  8/1/2024    Procedure: UMBILICAL HERNIA REPAIR;  Surgeon: Charleen Segal M.D.;  Location: SURGERY [facility];  Service: General    UMBILICAL HERNIA REPAIR N/A 12/6/2021    Procedure: REPAIR, HERNIA, UMBILICAL;  Surgeon: Charleen Segal M.D.;  Location: SURGERY SAME DAY [facility];  Service: General    ADENOIDECTOMY      TONSILLECTOMY     [3]   Current Outpatient Medications on File Prior to Visit   Medication Sig Dispense Refill    vitamin D2 (ERGOCALCIFEROL) 1.25 mg (59385 Units) Cap capsule Take 1 Capsule by mouth every 7 days for 360 days. 12 Capsule 3    budesonide-formoterol (SYMBICORT) 80-4.5 MCG/ACT Aerosol " Inhale 2 Puffs 2 times a day. Use with spacer.  Rinse mouth after each use. 1 Each 11    albuterol 108 (90 Base) MCG/ACT Aero Soln inhalation aerosol Inhale 2 Puffs every four hours as needed for Shortness of Breath. 1 Each 6    Ubrogepant (UBRELVY) 100 MG Tab Take 100 mg by mouth as needed (take 100 mg at onset of migraine.  May repeat dose in 2 hours if unrelieved.  Not to exceed 2 tablets in 24 hours.) for up to 30 days. 10 Tablet 5    lamoTRIgine (LAMICTAL) 25 MG Tab Take 50 mg by mouth every day.      citalopram (CELEXA) 20 MG Tab Take 20 mg by mouth every day.      zolpidem (AMBIEN) 5 MG Tab Take 5 mg by mouth at bedtime as needed for Sleep.      traZODone (DESYREL) 100 MG Tab Take 100 mg by mouth every evening.      omeprazole (PRILOSEC) 40 MG delayed-release capsule Take 40 mg by mouth every day.         hydrOXYzine pamoate (VISTARIL) 50 MG Cap Take 50 mg by mouth 4 times a day as needed for Anxiety.      Ferrous Sulfate (IRON PO) Take 1 Tablet by mouth every day.       No current facility-administered medications on file prior to visit.

## 2025-06-26 ENCOUNTER — TELEMEDICINE (OUTPATIENT)
Dept: SLEEP MEDICINE | Facility: MEDICAL CENTER | Age: 20
End: 2025-06-26
Payer: COMMERCIAL

## 2025-06-26 VITALS — BODY MASS INDEX: 24.49 KG/M2 | HEIGHT: 65 IN | WEIGHT: 147 LBS

## 2025-06-26 DIAGNOSIS — J45.20 MILD INTERMITTENT ASTHMA WITHOUT COMPLICATION: ICD-10-CM

## 2025-06-26 DIAGNOSIS — Z15.89 MONOALLELIC MUTATION OF SPTAN1 GENE: ICD-10-CM

## 2025-06-26 DIAGNOSIS — J44.9 OBSTRUCTIVE AIRWAY DISEASE (HCC): ICD-10-CM

## 2025-06-26 RX ORDER — BUDESONIDE AND FORMOTEROL FUMARATE DIHYDRATE 160; 4.5 UG/1; UG/1
2 AEROSOL RESPIRATORY (INHALATION) 2 TIMES DAILY
Qty: 10.2 G | Refills: 11 | Status: SHIPPED | OUTPATIENT
Start: 2025-06-26

## 2025-06-26 RX ORDER — ALBUTEROL SULFATE 90 UG/1
2 INHALANT RESPIRATORY (INHALATION) EVERY 4 HOURS PRN
Qty: 1 EACH | Refills: 6 | Status: SHIPPED | OUTPATIENT
Start: 2025-06-26

## 2025-06-26 ASSESSMENT — ENCOUNTER SYMPTOMS
DIAPHORESIS: 0
FEVER: 0
HEADACHES: 0
WHEEZING: 0
NAUSEA: 0
VOMITING: 0
HEARTBURN: 0
SHORTNESS OF BREATH: 1
CHILLS: 0
SINUS PAIN: 0
WEAKNESS: 0
HEMOPTYSIS: 0
DIZZINESS: 0
DIARRHEA: 0
COUGH: 1
MYALGIAS: 0
SPUTUM PRODUCTION: 0
FALLS: 0
PALPITATIONS: 0

## 2025-06-26 ASSESSMENT — FIBROSIS 4 INDEX: FIB4 SCORE: 0.26

## 2025-06-26 NOTE — ASSESSMENT & PLAN NOTE
PFTs in 2025 show no significant obstruction on spirometry but did show positive bronchodilator response and air trapping, suggested of RAD.  Lung volumes have also remained stable compared to 2024.  Symptoms have not significantly improved.  No exacerbations.   - Increase Symbicort to 160 from 80  - Continue albuterol as needed  - Advised patient to remain active  - Advised patient to remain up to date on all vaccines

## 2025-07-02 ENCOUNTER — TELEPHONE (OUTPATIENT)
Dept: PHYSICAL THERAPY | Facility: REHABILITATION | Age: 20
End: 2025-07-02
Payer: COMMERCIAL

## 2025-07-02 NOTE — OP THERAPY DISCHARGE SUMMARY
Outpatient Physical Therapy  DISCHARGE SUMMARY NOTE      [address]  [address]  [facility] [address]  Phone:  [phone]  Fax:  [phone]    Date of Visit: 07/02/2025    Patient: Eva Washington  YOB: 2005  MRN: [id number]     Referring Provider: Reed De Los Santos M.D.  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 Per policy DC, will require new referral and evaluation to return.    Sue Matt, PT, DPT    Date: 7/2/2025

## 2025-07-09 ENCOUNTER — APPOINTMENT (OUTPATIENT)
Dept: PHYSICAL THERAPY | Facility: REHABILITATION | Age: 20
End: 2025-07-09
Attending: STUDENT IN AN ORGANIZED HEALTH CARE EDUCATION/TRAINING PROGRAM
Payer: COMMERCIAL

## 2025-07-11 PROCEDURE — RXMED WILLOW AMBULATORY MEDICATION CHARGE: Performed by: STUDENT IN AN ORGANIZED HEALTH CARE EDUCATION/TRAINING PROGRAM

## 2025-07-13 ENCOUNTER — PHARMACY VISIT (OUTPATIENT)
Dept: PHARMACY | Facility: MEDICAL CENTER | Age: 20
End: 2025-07-13
Payer: COMMERCIAL

## 2025-07-16 DIAGNOSIS — Z00.6 CLINICAL TRIAL PARTICIPANT: ICD-10-CM

## 2025-07-19 ENCOUNTER — HOSPITAL ENCOUNTER (OUTPATIENT)
Dept: LAB | Facility: MEDICAL CENTER | Age: 20
End: 2025-07-19
Attending: STUDENT IN AN ORGANIZED HEALTH CARE EDUCATION/TRAINING PROGRAM
Payer: COMMERCIAL

## 2025-07-19 ENCOUNTER — HOSPITAL ENCOUNTER (OUTPATIENT)
Dept: LAB | Facility: MEDICAL CENTER | Age: 20
End: 2025-07-19
Attending: FAMILY MEDICINE

## 2025-07-19 DIAGNOSIS — F33.1 MODERATE EPISODE OF RECURRENT MAJOR DEPRESSIVE DISORDER (HCC): ICD-10-CM

## 2025-07-19 DIAGNOSIS — D50.9 IRON DEFICIENCY ANEMIA, UNSPECIFIED IRON DEFICIENCY ANEMIA TYPE: ICD-10-CM

## 2025-07-19 DIAGNOSIS — E55.9 VITAMIN D DEFICIENCY: ICD-10-CM

## 2025-07-19 DIAGNOSIS — E61.1 IRON DEFICIENCY: ICD-10-CM

## 2025-07-19 DIAGNOSIS — Z00.6 CLINICAL TRIAL PARTICIPANT: ICD-10-CM

## 2025-07-19 DIAGNOSIS — R53.82 CHRONIC FATIGUE: ICD-10-CM

## 2025-07-19 DIAGNOSIS — F41.8 ANXIETY WITH DEPRESSION: ICD-10-CM

## 2025-07-19 LAB
25(OH)D3 SERPL-MCNC: 41 NG/ML (ref 30–100)
ALBUMIN SERPL BCP-MCNC: 4.6 G/DL (ref 3.2–4.9)
ALBUMIN/GLOB SERPL: 1.4 G/DL
ALP SERPL-CCNC: 118 U/L (ref 30–99)
ALT SERPL-CCNC: 19 U/L (ref 2–50)
ANION GAP SERPL CALC-SCNC: 12 MMOL/L (ref 7–16)
AST SERPL-CCNC: 23 U/L (ref 12–45)
BASOPHILS # BLD AUTO: 1.3 % (ref 0–1.8)
BASOPHILS # BLD: 0.08 K/UL (ref 0–0.12)
BILIRUB SERPL-MCNC: 0.3 MG/DL (ref 0.1–1.5)
BUN SERPL-MCNC: 9 MG/DL (ref 8–22)
CALCIUM ALBUM COR SERPL-MCNC: 9.4 MG/DL (ref 8.5–10.5)
CALCIUM SERPL-MCNC: 9.9 MG/DL (ref 8.5–10.5)
CHLORIDE SERPL-SCNC: 104 MMOL/L (ref 96–112)
CO2 SERPL-SCNC: 23 MMOL/L (ref 20–33)
CREAT SERPL-MCNC: 0.81 MG/DL (ref 0.5–1.4)
EOSINOPHIL # BLD AUTO: 0.16 K/UL (ref 0–0.51)
EOSINOPHIL NFR BLD: 2.6 % (ref 0–6.9)
ERYTHROCYTE [DISTWIDTH] IN BLOOD BY AUTOMATED COUNT: 38.9 FL (ref 35.9–50)
FERRITIN SERPL-MCNC: 29.4 NG/ML (ref 10–291)
FOLATE SERPL-MCNC: 10.1 NG/ML
GFR SERPLBLD CREATININE-BSD FMLA CKD-EPI: 107 ML/MIN/1.73 M 2
GLOBULIN SER CALC-MCNC: 3.3 G/DL (ref 1.9–3.5)
GLUCOSE SERPL-MCNC: 90 MG/DL (ref 65–99)
HCT VFR BLD AUTO: 42.1 % (ref 37–47)
HCYS SERPL-SCNC: 9.63 UMOL/L
HGB BLD-MCNC: 14.1 G/DL (ref 12–16)
IMM GRANULOCYTES # BLD AUTO: 0.02 K/UL (ref 0–0.11)
IMM GRANULOCYTES NFR BLD AUTO: 0.3 % (ref 0–0.9)
IRON SATN MFR SERPL: 15 % (ref 15–55)
IRON SERPL-MCNC: 48 UG/DL (ref 40–170)
LYMPHOCYTES # BLD AUTO: 2.26 K/UL (ref 1–4.8)
LYMPHOCYTES NFR BLD: 36.2 % (ref 22–41)
MCH RBC QN AUTO: 27.2 PG (ref 27–33)
MCHC RBC AUTO-ENTMCNC: 33.5 G/DL (ref 32.2–35.5)
MCV RBC AUTO: 81.3 FL (ref 81.4–97.8)
MONOCYTES # BLD AUTO: 0.47 K/UL (ref 0–0.85)
MONOCYTES NFR BLD AUTO: 7.5 % (ref 0–13.4)
NEUTROPHILS # BLD AUTO: 3.25 K/UL (ref 1.82–7.42)
NEUTROPHILS NFR BLD: 52.1 % (ref 44–72)
NRBC # BLD AUTO: 0 K/UL
NRBC BLD-RTO: 0 /100 WBC (ref 0–0.2)
PLATELET # BLD AUTO: 350 K/UL (ref 164–446)
PMV BLD AUTO: 9.5 FL (ref 9–12.9)
POTASSIUM SERPL-SCNC: 4.4 MMOL/L (ref 3.6–5.5)
PROT SERPL-MCNC: 7.9 G/DL (ref 6–8.2)
RBC # BLD AUTO: 5.18 M/UL (ref 4.2–5.4)
SODIUM SERPL-SCNC: 139 MMOL/L (ref 135–145)
TIBC SERPL-MCNC: 329 UG/DL (ref 250–450)
TRANSFERRIN SERPL-MCNC: 271 MG/DL (ref 200–370)
TSH SERPL DL<=0.005 MIU/L-ACNC: 1.56 UIU/ML (ref 0.38–5.33)
UIBC SERPL-MCNC: 281 UG/DL (ref 110–370)
VIT B12 SERPL-MCNC: 766 PG/ML (ref 211–911)
WBC # BLD AUTO: 6.2 K/UL (ref 4.8–10.8)

## 2025-07-19 PROCEDURE — 82180 ASSAY OF ASCORBIC ACID: CPT

## 2025-07-19 PROCEDURE — 85025 COMPLETE CBC W/AUTO DIFF WBC: CPT

## 2025-07-19 PROCEDURE — 83540 ASSAY OF IRON: CPT

## 2025-07-19 PROCEDURE — 80053 COMPREHEN METABOLIC PANEL: CPT

## 2025-07-19 PROCEDURE — 84443 ASSAY THYROID STIM HORMONE: CPT

## 2025-07-19 PROCEDURE — 82728 ASSAY OF FERRITIN: CPT

## 2025-07-19 PROCEDURE — 82607 VITAMIN B-12: CPT

## 2025-07-19 PROCEDURE — 36415 COLL VENOUS BLD VENIPUNCTURE: CPT

## 2025-07-19 PROCEDURE — 82746 ASSAY OF FOLIC ACID SERUM: CPT

## 2025-07-19 PROCEDURE — 83090 ASSAY OF HOMOCYSTEINE: CPT

## 2025-07-19 PROCEDURE — 83550 IRON BINDING TEST: CPT

## 2025-07-19 PROCEDURE — 84466 ASSAY OF TRANSFERRIN: CPT

## 2025-07-19 PROCEDURE — 83921 ORGANIC ACID SINGLE QUANT: CPT

## 2025-07-19 PROCEDURE — 82306 VITAMIN D 25 HYDROXY: CPT

## 2025-07-23 LAB — VIT C SERPL-MCNC: 63 UMOL/L (ref 23–114)

## 2025-07-24 LAB — METHYLMALONATE SERPL-SCNC: 0.16 UMOL/L (ref 0–0.4)

## 2025-07-29 ENCOUNTER — RESULTS FOLLOW-UP (OUTPATIENT)
Dept: MEDICAL GROUP | Facility: PHYSICIAN GROUP | Age: 20
End: 2025-07-29
Payer: COMMERCIAL

## 2025-07-29 LAB
APOB+LDLR+PCSK9 GENE MUT ANL BLD/T: NOT DETECTED
BRCA1+BRCA2 DEL+DUP + FULL MUT ANL BLD/T: NOT DETECTED
MLH1+MSH2+MSH6+PMS2 GN DEL+DUP+FUL M: NOT DETECTED

## 2025-08-04 ENCOUNTER — APPOINTMENT (OUTPATIENT)
Dept: PHYSICAL THERAPY | Facility: REHABILITATION | Age: 20
End: 2025-08-04
Attending: STUDENT IN AN ORGANIZED HEALTH CARE EDUCATION/TRAINING PROGRAM
Payer: COMMERCIAL

## 2025-08-07 ENCOUNTER — SPECIALTY PHARMACY (OUTPATIENT)
Dept: PHARMACY | Facility: MEDICAL CENTER | Age: 20
End: 2025-08-07
Payer: COMMERCIAL

## 2025-08-07 PROCEDURE — RXMED WILLOW AMBULATORY MEDICATION CHARGE: Performed by: STUDENT IN AN ORGANIZED HEALTH CARE EDUCATION/TRAINING PROGRAM

## 2025-08-08 ENCOUNTER — PHARMACY VISIT (OUTPATIENT)
Dept: PHARMACY | Facility: MEDICAL CENTER | Age: 20
End: 2025-08-08
Payer: COMMERCIAL

## 2025-08-27 ENCOUNTER — APPOINTMENT (OUTPATIENT)
Dept: PHYSICAL THERAPY | Facility: REHABILITATION | Age: 20
End: 2025-08-27
Attending: STUDENT IN AN ORGANIZED HEALTH CARE EDUCATION/TRAINING PROGRAM
Payer: COMMERCIAL

## (undated) DEVICE — TUBING CLEARLINK DUO-VENT - C-FLO (48EA/CA)

## (undated) DEVICE — WIRE SINGLE TROCAR 1.6MM X 150MM

## (undated) DEVICE — SUTURE GENERAL

## (undated) DEVICE — SUTURE 3-0 VICRYL PLUS SH - 8X 18 INCH (12/BX)

## (undated) DEVICE — GLOVE BIOGEL SZ 8 SURGICAL PF LTX - (50PR/BX 4BX/CA)

## (undated) DEVICE — KIT  I.V. START (100EA/CA)

## (undated) DEVICE — Device

## (undated) DEVICE — BANDAGE ELASTIC 6 HONEYCOMB - 6X5YD LF (20/CA)"

## (undated) DEVICE — GOWN WARMING STANDARD FLEX - (30/CA)

## (undated) DEVICE — PROTECTOR ULNA NERVE - (36PR/CA)

## (undated) DEVICE — CATHETER IV 20 GA X 1-1/4 ---SURG.& SDS ONLY--- (50EA/BX)

## (undated) DEVICE — SODIUM CHL IRRIGATION 0.9% 1000ML (12EA/CA)

## (undated) DEVICE — DRAPE LAPAROTOMY T SHEET - (12EA/CA)

## (undated) DEVICE — GLOVE BIOGEL INDICATOR SZ 8.5 SURGICAL PF LTX - (50/BX 4BX/CA)

## (undated) DEVICE — MASK ANESTHESIA ADULT  - (100/CA)

## (undated) DEVICE — LACTATED RINGERS INJ 1000 ML - (14EA/CA 60CA/PF)

## (undated) DEVICE — DRILL BIT 2.5MM X 60MM

## (undated) DEVICE — CANISTER SUCTION 3000ML MECHANICAL FILTER AUTO SHUTOFF MEDI-VAC NONSTERILE LF DISP (40EA/CA)

## (undated) DEVICE — GOWN SURGEONS X-LARGE - DISP. (30/CA)

## (undated) DEVICE — SET EXTENSION WITH 2 PORTS (48EA/CA) ***PART #2C8610 IS A SUBSTITUTE*****

## (undated) DEVICE — GLOVE BIOGEL SZ 6.5 SURGICAL PF LTX (50PR/BX 4BX/CA)

## (undated) DEVICE — MASK AIRWAY SIZE 3 UNIQUE SILICON (10/BX)

## (undated) DEVICE — SUTURE 3-0 VICRYL PLUS SH - 27 INCH (36/BX)

## (undated) DEVICE — DRAPE LARGE 3 QUARTER - (20/CA)

## (undated) DEVICE — SUTURE 4-0 VICRYL PLUS FS-1 - 27 INCH (36/BX)

## (undated) DEVICE — GOWN SURGEONS LARGE - (32/CA)

## (undated) DEVICE — SUTURE 3-0 MONOCRYL PLUS PS-1 - 27 INCH (36/BX)

## (undated) DEVICE — GLOVE BIOGEL PI INDICATOR SZ 7.0 SURGICAL PF LF - (50/BX 4BX/CA)

## (undated) DEVICE — SUCTION INSTRUMENT YANKAUER BULBOUS TIP W/O VENT (50EA/CA)

## (undated) DEVICE — TUBE CONNECTING SUCTION - CLEAR PLASTIC STERILE 72 IN (50EA/CA)

## (undated) DEVICE — BLADE SURGICAL #15 - (50/BX 3BX/CA)

## (undated) DEVICE — COVER LIGHT HANDLE ALC PLUS DISP (18EA/BX)

## (undated) DEVICE — CHLORAPREP 26 ML APPLICATOR - ORANGE TINT(25/CA)

## (undated) DEVICE — SET LEADWIRE 5 LEAD BEDSIDE DISPOSABLE ECG (1SET OF 5/EA)

## (undated) DEVICE — CANISTER SUCTION RIGID RED 1500CC (40EA/CA)

## (undated) DEVICE — SHEET PEDIATRIC LAPAROTOMY - (10/CA)

## (undated) DEVICE — SUTURE 0 ETHIBOND MO6 C/R - (12/BX) 8-18 INCH ETHICON

## (undated) DEVICE — PADDING CAST 6 IN STERILE - 6 X 4 YDS (24/CA)

## (undated) DEVICE — ELECTRODE 850 FOAM ADHESIVE - HYDROGEL RADIOTRNSPRNT (50/PK)

## (undated) DEVICE — PACK MINOR BASIN - (2EA/CA)

## (undated) DEVICE — SUTURE 0 PROLENE CT-1 30 (36PK/BX)"

## (undated) DEVICE — PAD LAP STERILE 18 X 18 - (5/PK 40PK/CA)

## (undated) DEVICE — GLOVE BIOGEL INDICATOR SZ 6.5 SURGICAL PF LTX - (50PR/BX 4BX/CA)

## (undated) DEVICE — GLOVE SZ 7 BIOGEL PI MICRO - PF LF (50PR/BX 4BX/CA)

## (undated) DEVICE — SENSOR OXIMETER ADULT SPO2 RD SET (20EA/BX)

## (undated) DEVICE — DRAPE LOWER EXTREMETY - (6/CA)

## (undated) DEVICE — CANISTER SUCTION 3000ML MECHANICAL FILTER AUTO SHUTOFF MEDI-VAC NONSTERILE LF DISP  (40EA/CA)

## (undated) DEVICE — DRESSING TRANSPARENT FILM TEGADERM 2.375 X 2.75"  (100EA/BX)"

## (undated) DEVICE — SENSOR SPO2 NEO LNCS ADHESIVE (20/BX) SEE USER NOTES

## (undated) DEVICE — ELECTRODE DUAL RETURN W/ CORD - (50/PK)

## (undated) DEVICE — KIT ANESTHESIA W/CIRCUIT & 3/LT BAG W/FILTER (20EA/CA)

## (undated) DEVICE — SPONGE GAUZESTER 4 X 4 4PLY - (128PK/CA)

## (undated) DEVICE — NEPTUNE 4 PORT MANIFOLD - (20/PK)

## (undated) DEVICE — PACK LOWER EXTREMITY - (2/CA)

## (undated) DEVICE — SPONGE GAUZESTER. 2X2 4-PL - (2/PK 50PK/BX 30BX/CS)

## (undated) DEVICE — SUTURE 3-0 VICRYL PLUS - 12 X 18 INCH (12/BX)

## (undated) DEVICE — GLOVE BIOGEL PI ORTHO SZ 7.5 PF LF (40PR/BX)

## (undated) DEVICE — SUTURE 4-0 VICRYL PLUS FS-2 - 27 INCH (36/BX)

## (undated) DEVICE — GLOVESZ 8.5 BIOGEL PI MICRO - PF LF (50PR/BX)

## (undated) DEVICE — HEAD HOLDER JUNIOR/ADULT

## (undated) DEVICE — WATER IRRIGATION STERILE 1000ML (12EA/CA)

## (undated) DEVICE — GLOVE SZ 7.5 BIOGEL PI MICRO - PF LF (50PR/BX)

## (undated) DEVICE — SLEEVE, VASO, THIGH, MED

## (undated) DEVICE — SUTURE 3-0 ETHILON PS-1 (36PK/BX)

## (undated) DEVICE — TOWEL STOP TIMEOUT SAFETY FLAG (40EA/CA)

## (undated) DEVICE — TOURNIQUET CUFF 24 X 4 ONE PORT - STERILE (10/BX)